# Patient Record
Sex: FEMALE | Race: WHITE | NOT HISPANIC OR LATINO | Employment: FULL TIME | URBAN - METROPOLITAN AREA
[De-identification: names, ages, dates, MRNs, and addresses within clinical notes are randomized per-mention and may not be internally consistent; named-entity substitution may affect disease eponyms.]

---

## 2017-02-08 ENCOUNTER — OFFICE VISIT (OUTPATIENT)
Dept: FAMILY MEDICINE | Facility: CLINIC | Age: 47
End: 2017-02-08
Payer: COMMERCIAL

## 2017-02-08 VITALS
DIASTOLIC BLOOD PRESSURE: 72 MMHG | BODY MASS INDEX: 29.43 KG/M2 | TEMPERATURE: 97.9 F | OXYGEN SATURATION: 99 % | SYSTOLIC BLOOD PRESSURE: 142 MMHG | HEIGHT: 64 IN | HEART RATE: 78 BPM | WEIGHT: 172.4 LBS

## 2017-02-08 DIAGNOSIS — R09.82 POST-NASAL DRIP: ICD-10-CM

## 2017-02-08 DIAGNOSIS — J02.9 PHARYNGITIS, UNSPECIFIED ETIOLOGY: Primary | ICD-10-CM

## 2017-02-08 DIAGNOSIS — E78.5 HYPERLIPIDEMIA LDL GOAL <130: ICD-10-CM

## 2017-02-08 LAB
DEPRECATED S PYO AG THROAT QL EIA: NORMAL
MICRO REPORT STATUS: NORMAL
SPECIMEN SOURCE: NORMAL

## 2017-02-08 PROCEDURE — 87880 STREP A ASSAY W/OPTIC: CPT | Performed by: NURSE PRACTITIONER

## 2017-02-08 PROCEDURE — 87081 CULTURE SCREEN ONLY: CPT | Performed by: NURSE PRACTITIONER

## 2017-02-08 PROCEDURE — 99213 OFFICE O/P EST LOW 20 MIN: CPT | Performed by: NURSE PRACTITIONER

## 2017-02-08 ASSESSMENT — ENCOUNTER SYMPTOMS
SINUS PRESSURE: 0
LIGHT-HEADEDNESS: 0
CHILLS: 0
FATIGUE: 0
NAUSEA: 0
DIAPHORESIS: 0
COUGH: 0
FEVER: 0
DIZZINESS: 0
EYE DISCHARGE: 0
EYE ITCHING: 0
SORE THROAT: 1
SHORTNESS OF BREATH: 0
DIARRHEA: 0
CONSTIPATION: 0
RHINORRHEA: 0
HEADACHES: 0
WHEEZING: 0

## 2017-02-08 NOTE — PATIENT INSTRUCTIONS
-Start OTC Claritin, Zyrtec, or Allegra-may use store brand as well.     Notify if no improvement over the next 2 weeks

## 2017-02-08 NOTE — Clinical Note
[facility]  [address] [address]  Phone: [phone]    February 10, 2017    Aixa Coker  [facility] [facility] [address]              Dear Ms. Coker,      The results of your 24 hour throat culture were negative. Please contact your clinic if you have any questions or concerns.              Sincerely,      [facility]

## 2017-02-08 NOTE — MR AVS SNAPSHOT
After Visit Summary   2/8/2017    Aixa Coker    MRN: [id number]           Patient Information     Date Of Birth          1970        Visit Information        Provider Department      2/8/2017 10:40 AM Zuly Colon APRN [facility]        Today's Diagnoses     Pharyngitis, unspecified etiology    -  1     CARDIOVASCULAR SCREENING; LDL GOAL LESS THAN 160         Hyperlipidemia LDL goal <130           Care Instructions       -Start OTC Claritin, Zyrtec, or Allegra-may use store brand as well.     Notify if no improvement over the next 2 weeks                    Follow-ups after your visit        Future tests that were ordered for you today     Open Future Orders        Priority Expected Expires Ordered    Lipid panel reflex to direct LDL Routine  2/8/2018 2/8/2017            Who to contact     Normal or non-critical lab and imaging results will be communicated to you by [facility], letter or phone within 4 business days after the clinic has received the results. If you do not hear from us within 7 days, please contact the clinic through [facility] or phone. If you have a critical or abnormal lab result, we will notify you by phone as soon as possible.  Submit refill requests through [employer] or call your pharmacy and they will forward the refill request to us. Please allow 3 business days for your refill to be completed.          If you need to speak with a  for additional information , please call: [phone]           Additional Information About Your Visit        [facility] Information     [employer] gives you secure access to your electronic health record. If you see a primary care provider, you can also send messages to your care team and make appointments. If you have questions, please call your primary care clinic.  If you do not have a primary care provider, please call [phone] and they will assist you.        Care EveryWhere ID     This is  "your Care EveryWhere ID. This could be used by other organizations to access your [place] medical records  [id number]        Your Vitals Were     Pulse Temperature Height BMI (Body Mass Index) Pulse Oximetry       78 97.9  F (36.6  C) (Tympanic) 5' 3.75\" (1.619 m) 29.83 kg/m2 99%        Blood Pressure from Last 3 Encounters:   02/08/17 142/72   09/21/16 148/83   08/09/16 150/93    Weight from Last 3 Encounters:   02/08/17 172 lb 6.4 oz (78.2 kg)   10/16/14 164 lb (74.39 kg)   08/27/14 164 lb (74.39 kg)              We Performed the Following     Beta strep group A culture     Rapid strep screen        Primary Care Provider Office Phone # Fax #    Jaye Atkins DO Javier [phone]       [address]   [place] [address]        Thank you!     Thank you for choosing [facility]  for your care. Our goal is always to provide you with excellent care. Hearing back from our patients is one way we can continue to improve our services. Please take a few minutes to complete the written survey that you may receive in the mail after your visit with us. Thank you!             Your Updated Medication List - Protect others around you: Learn how to safely use, store and throw away your medicines at www.disposemymeds.org.      Notice  As of 2/8/2017 11:02 AM    You have not been prescribed any medications.      "

## 2017-02-08 NOTE — NURSING NOTE
"Chief Complaint   Patient presents with     Pharyngitis       Initial /72 mmHg  Pulse 78  Temp(Src) 97.9  F (36.6  C) (Tympanic)  Ht 5' 3.75\" (1.619 m)  Wt 172 lb 6.4 oz (78.2 kg)  BMI 29.83 kg/m2  SpO2 99% Estimated body mass index is 29.83 kg/(m^2) as calculated from the following:    Height as of this encounter: 5' 3.75\" (1.619 m).    Weight as of this encounter: 172 lb 6.4 oz (78.2 kg).  BP completed using cuff size: jesus alberto Vu CMA      "

## 2017-02-08 NOTE — PROGRESS NOTES
SUBJECTIVE:                                                    Aixa Coker is a 46 year old female who presents to clinic today for the following health issues:      Has had sore throat off and on for the last month. Did have some swelling behind right ear and now that is better. Does feel like has some drainage. No cough. Advil occasional if is having some discomfort at night. Having pressure to left ear. Is not around pets. No allergies that is aware of    ENT Symptoms             Symptoms: cc Present Absent Comment   Fever/Chills   x    Fatigue   x    Muscle Aches   x    Eye Irritation   x    Sneezing  x  mild   Nasal [place]/Drg  x     Sinus Pressure/Pain   x    Loss of smell   x    Dental pain   x    Sore Throat x x  Hurts to swallow   Swollen Glands  x     Ear Pain/Fullness  x  Left ear pressure, pain near right ear one week ago   Cough   x    Wheeze   x    Chest Pain   x    Shortness of breath   x    Rash   x    Other   x      Symptom duration:  6-8 weeks   Symptom severity:  severe   Treatments tried:  warm washcloth by ear, Advil   Contacts:  work         Problem list and histories reviewed & adjusted, as indicated.  Additional history: as documented    No current outpatient prescriptions on file.     No Known Allergies    ROS:  Review of Systems   Constitutional: Negative for fever, chills, diaphoresis and fatigue.   HENT: Positive for congestion, ear pain (bilat, left is worse than right, but right was worse last week. ), sneezing and sore throat (swollen glands). Negative for ear discharge, hearing loss, rhinorrhea and sinus pressure.    Eyes: Negative for discharge and itching.   Respiratory: Negative for cough, shortness of breath and wheezing.    Gastrointestinal: Negative for nausea, diarrhea and constipation.   Skin: Negative for rash.   Neurological: Negative for dizziness, light-headedness and headaches.         OBJECTIVE:                                                    /72 mmHg   "Pulse 78  Temp(Src) 97.9  F (36.6  C) (Tympanic)  Ht 5' 3.75\" (1.619 m)  Wt 172 lb 6.4 oz (78.2 kg)  BMI 29.83 kg/m2  SpO2 99%  Body mass index is 29.83 kg/(m^2).  Physical Exam   Constitutional: She appears well-developed.   HENT:   Right Ear: Tympanic membrane and external ear normal.   Left Ear: Tympanic membrane and external ear normal.   Nose: No mucosal edema or rhinorrhea.   Mouth/Throat:       Cardiovascular: Normal rate, regular rhythm and normal heart sounds.    Pulmonary/Chest: Effort normal and breath sounds normal.   Abdominal: Soft. Bowel sounds are normal.   Neurological: She is alert.   Skin: Skin is warm and dry.   Psychiatric: She has a normal mood and affect.         Diagnostic Test Results:  Strep screen - Negative     ASSESSMENT/PLAN:                                                    1. Pharyngitis, unspecified etiology  - Rapid strep screen  - Beta strep group A culture    2. Hyperlipidemia LDL goal <130  Return for fasting labs   - Lipid panel reflex to direct LDL; Future    3. Post-nasal drip  Treatment plan and medications reviewed and understood by patient.   Instructed to call or return for:   - trouble breathing   - sensation of throat tightness   - symptoms worsen or fail to resolve    -Start OTC Claritin, Zyrtec, or Allegra   Notify if no improvement in drainage and may need refer to allergy for further eval.           GUDELIA Monroy [facility]    "

## 2017-02-10 LAB
BACTERIA SPEC CULT: NORMAL
MICRO REPORT STATUS: NORMAL
SPECIMEN SOURCE: NORMAL

## 2017-08-17 ENCOUNTER — OFFICE VISIT (OUTPATIENT)
Dept: FAMILY MEDICINE | Facility: CLINIC | Age: 47
End: 2017-08-17
Payer: COMMERCIAL

## 2017-08-17 VITALS
TEMPERATURE: 98.6 F | HEART RATE: 72 BPM | BODY MASS INDEX: 26.8 KG/M2 | HEIGHT: 64 IN | WEIGHT: 157 LBS | DIASTOLIC BLOOD PRESSURE: 74 MMHG | SYSTOLIC BLOOD PRESSURE: 128 MMHG

## 2017-08-17 DIAGNOSIS — N94.10 DYSPAREUNIA IN FEMALE: ICD-10-CM

## 2017-08-17 DIAGNOSIS — Z13.6 CARDIOVASCULAR SCREENING; LDL GOAL LESS THAN 160: ICD-10-CM

## 2017-08-17 DIAGNOSIS — Z00.00 ENCOUNTER FOR ROUTINE ADULT HEALTH EXAMINATION WITHOUT ABNORMAL FINDINGS: Primary | ICD-10-CM

## 2017-08-17 DIAGNOSIS — R07.0 THROAT PAIN: ICD-10-CM

## 2017-08-17 DIAGNOSIS — N91.2 ABSENCE OF MENSTRUATION: ICD-10-CM

## 2017-08-17 DIAGNOSIS — N95.1 VAGINAL DRYNESS, MENOPAUSAL: ICD-10-CM

## 2017-08-17 LAB
CHOLEST SERPL-MCNC: 190 MG/DL
FSH SERPL-ACNC: 55.9 IU/L
GLUCOSE SERPL-MCNC: 97 MG/DL (ref 70–99)
HDLC SERPL-MCNC: 49 MG/DL
LDLC SERPL CALC-MCNC: 120 MG/DL
LH SERPL-ACNC: 29.8 IU/L
NONHDLC SERPL-MCNC: 141 MG/DL
TRIGL SERPL-MCNC: 104 MG/DL

## 2017-08-17 PROCEDURE — 82947 ASSAY GLUCOSE BLOOD QUANT: CPT | Performed by: PHYSICIAN ASSISTANT

## 2017-08-17 PROCEDURE — 99396 PREV VISIT EST AGE 40-64: CPT | Performed by: PHYSICIAN ASSISTANT

## 2017-08-17 PROCEDURE — 83002 ASSAY OF GONADOTROPIN (LH): CPT | Performed by: PHYSICIAN ASSISTANT

## 2017-08-17 PROCEDURE — 80061 LIPID PANEL: CPT | Performed by: PHYSICIAN ASSISTANT

## 2017-08-17 PROCEDURE — 83001 ASSAY OF GONADOTROPIN (FSH): CPT | Performed by: PHYSICIAN ASSISTANT

## 2017-08-17 PROCEDURE — 36415 COLL VENOUS BLD VENIPUNCTURE: CPT | Performed by: PHYSICIAN ASSISTANT

## 2017-08-17 NOTE — PROGRESS NOTES
"   SUBJECTIVE:   CC: Aixa Coker is an 47 year old woman who presents for preventive health visit.     Healthy Habits:    Do you get at least three servings of calcium containing foods daily (dairy, green leafy vegetables, etc.)? no, taking calcium and/or vitamin D supplement: no    Amount of exercise or daily activities, outside of work: 3-4 day(s) per week    Problems taking medications regularly No    Medication side effects: No    Have you had an eye exam in the past two years? no    Do you see a dentist twice per year? yes    Do you have sleep apnea, excessive snoring or daytime drowsiness?yes, daytime drowsiness         Wanting to talk about menopause. Having some symptoms.  Not sure if she's in menopause or not  Had an ablation about 7-8 years ago because she was having heavy, continuous bleeding  Has not had a period since  Denies hot flashes  + not sleeping well at night -  More restless than normal  + painful intercourse which is frustrating for her and her   Has h/o frequent vaginal and yeast infections so for awhile that was affecting her desire to have sex. Now it is uncomfortable   Has not tried over the counter lubricants as she is worried about reacting as she is very \"sensitive\"     Also has an intermittent sore throat - not bothersome today  Would like to see ENT  Was told at one point she might have some PND   Has not tried any nasal sprays  No h/o GERD or heartburn      Today's PHQ-2 Score:   PHQ-2 ( 1999 Pf[employer]) 8/17/2017 8/27/2014   Q1: Little interest or pleasure in doing things 0 0   Q2: Feeling down, depressed or hopeless 0 0   PHQ-2 Score 0 0       Abuse: Current or Past(Physical, Sexual or Emotional)- No  Do you feel safe in your environment - Yes    Social History   Substance Use Topics     Smoking status: Never Smoker     Smokeless tobacco: Never Used     Alcohol use Yes      Comment: Avg. 1-2 drinks per week     The patient does not drink >3 drinks per day nor >7 drinks " "per week.    Reviewed orders with patient.  Reviewed health maintenance and updated orders accordingly - Yes  BP Readings from Last 3 Encounters:   08/17/17 128/74   02/08/17 142/72   09/21/16 148/83    Wt Readings from Last 3 Encounters:   08/17/17 157 lb (71.2 kg)   02/08/17 172 lb 6.4 oz (78.2 kg)   10/16/14 164 lb (74.4 kg)                      Patient under age 50, mutual decision reflected in health maintenance.        Pertinent mammograms are reviewed under the imaging tab.  History of abnormal Pap smear: NO - age 30-65 PAP every 5 years with negative HPV co-testing recommended    Reviewed and updated as needed this visit by clinical staffTobacco  Allergies  Meds  Med Hx  Surg Hx  Fam Hx  Soc Hx        Reviewed and updated as needed this visit by Provider          ROS:  C: NEGATIVE for fever, chills, change in weight  I: NEGATIVE for worrisome rashes, moles or lesions  E: NEGATIVE for vision changes or irritation  ENT: NEGATIVE for ear, mouth and throat problems  R: NEGATIVE for significant cough or SOB  B: NEGATIVE for masses, tenderness or discharge  CV: NEGATIVE for chest pain, palpitations or peripheral edema  GI: NEGATIVE for nausea, abdominal pain, heartburn, or change in bowel habits  : NEGATIVE for unusual urinary or vaginal symptoms. Periods are regular.  M: NEGATIVE for significant arthralgias or myalgia  N: NEGATIVE for weakness, dizziness or paresthesias  P: NEGATIVE for changes in mood or affect    OBJECTIVE:   /74 (BP Location: Right arm, Patient Position: Chair, Cuff Size: Adult Regular)  Pulse 72  Temp 98.6  F (37  C) (Tympanic)  Ht 5' 3.75\" (1.619 m)  Wt 157 lb (71.2 kg)  BMI 27.16 kg/m2  EXAM:  GENERAL: healthy, alert and no distress  EYES: Eyes grossly normal to inspection, PERRL and conjunctivae and sclerae normal  HENT: ear canals and TM's normal, nose and mouth without ulcers or lesions  NECK: no adenopathy, no asymmetry, masses, or scars and thyroid normal to " "palpation  RESP: lungs clear to auscultation - no rales, rhonchi or wheezes  BREAST: normal without masses, tenderness or nipple discharge and no palpable axillary masses or adenopathy  CV: regular rate and rhythm, normal S1 S2, no S3 or S4, no murmur, click or rub, no peripheral edema and peripheral pulses strong  ABDOMEN: soft, nontender, no hepatosplenomegaly, no masses and bowel sounds normal  MS: no gross musculoskeletal defects noted, no edema  SKIN: no suspicious lesions or rashes  NEURO: Normal strength and tone, mentation intact and speech normal  PSYCH: mentation appears normal, affect normal/bright    ASSESSMENT/PLAN:       ICD-10-CM    1. Encounter for routine adult health examination without abnormal findings Z00.00    2. Vaginal dryness, menopausal N95.1 conjugated estrogens (PREMARIN) cream   3. Dyspareunia in female N94.10 conjugated estrogens (PREMARIN) cream   4. Throat pain R07.0 OTOLARYNGOLOGY REFERRAL   5. CARDIOVASCULAR SCREENING; LDL GOAL LESS THAN 160 Z13.6 GLUCOSE     Lipid panel reflex to direct LDL   6. Absence of menstruation N91.2 Follicle stimulating hormone     Lutropin           COUNSELING:   Reviewed preventive health counseling, as reflected in patient instructions       Regular exercise       Healthy diet/nutrition         reports that she has never smoked. She has never used smokeless tobacco.    Estimated body mass index is 27.16 kg/(m^2) as calculated from the following:    Height as of this encounter: 5' 3.75\" (1.619 m).    Weight as of this encounter: 157 lb (71.2 kg).         Counseling Resources:  ATP IV Guidelines  Pooled Cohorts Equation Calculator  Breast Cancer Risk Calculator  FRAX Risk Assessment  ICSI Preventive Guidelines  Dietary Guidelines for Americans, 2010  USDA's MyPlate  ASA Prophylaxis  Lung CA Screening    Gracia Luu PA-C  [facility]  "

## 2017-08-17 NOTE — PATIENT INSTRUCTIONS
Topical estrogen - for vaginal dryness    Start by using two times weekly  Let me know in a few weeks how things are going     I will contact you within a few days about the lab results        Preventive Health Recommendations  Female Ages 40 to 49    Yearly exam:     See your health care provider every year in order to  1. Review health changes.   2. Discuss preventive care.    3. Review your medicines if your doctor prescribed any.      Get a Pap test every three years (unless you have an abnormal result and your provider advises testing more often).      If you get Pap tests with HPV test, you only need to test every 5 years, unless you have an abnormal result. You do not need a Pap test if your uterus was removed (hysterectomy) and you have not had cancer.      You should be tested each year for STDs (sexually transmitted diseases), if you're at risk.       Ask your doctor if you should have a mammogram.      Have a colonoscopy (test for colon cancer) if someone in your family has had colon cancer or polyps before age 50.       Have a cholesterol test every 5 years.       Have a diabetes test (fasting glucose) after age 45. If you are at risk for diabetes, you should have this test every 3 years.    Shots: Get a flu shot each year. Get a tetanus shot every 10 years.     Nutrition:     Eat at least 5 servings of fruits and vegetables each day.    Eat whole-grain bread, whole-wheat pasta and brown rice instead of white grains and rice.    Talk to your provider about Calcium and Vitamin D.     Lifestyle    Exercise at least 150 minutes a week (an average of 30 minutes a day, 5 days a week). This will help you control your weight and prevent disease.    Limit alcohol to one drink per day.    No smoking.     Wear sunscreen to prevent skin cancer.    See your dentist every six months for an exam and cleaning.

## 2017-08-17 NOTE — MR AVS SNAPSHOT
After Visit Summary   8/17/2017    Aixa Coker    MRN: [id number]           Patient Information     Date Of Birth          1970        Visit Information        Provider Department      8/17/2017 8:40 AM Gracia Luu PA-C [facility]        Today's Diagnoses     CARDIOVASCULAR SCREENING; LDL GOAL LESS THAN 160    -  1    Absence of menstruation        Vaginal dryness, menopausal        Dyspareunia in female        Throat pain          Care Instructions    Topical estrogen - for vaginal dryness    Start by using two times weekly  Let me know in a few weeks how things are going     I will contact you within a few days about the lab results        Preventive Health Recommendations  Female Ages 40 to 49    Yearly exam:     See your health care provider every year in order to  1. Review health changes.   2. Discuss preventive care.    3. Review your medicines if your doctor prescribed any.      Get a Pap test every three years (unless you have an abnormal result and your provider advises testing more often).      If you get Pap tests with HPV test, you only need to test every 5 years, unless you have an abnormal result. You do not need a Pap test if your uterus was removed (hysterectomy) and you have not had cancer.      You should be tested each year for STDs (sexually transmitted diseases), if you're at risk.       Ask your doctor if you should have a mammogram.      Have a colonoscopy (test for colon cancer) if someone in your family has had colon cancer or polyps before age 50.       Have a cholesterol test every 5 years.       Have a diabetes test (fasting glucose) after age 45. If you are at risk for diabetes, you should have this test every 3 years.    Shots: Get a flu shot each year. Get a tetanus shot every 10 years.     Nutrition:     Eat at least 5 servings of fruits and vegetables each day.    Eat whole-grain bread, whole-wheat pasta and brown rice instead of  white grains and rice.    Talk to your provider about Calcium and Vitamin D.     Lifestyle    Exercise at least 150 minutes a week (an average of 30 minutes a day, 5 days a week). This will help you control your weight and prevent disease.    Limit alcohol to one drink per day.    No smoking.     Wear sunscreen to prevent skin cancer.    See your dentist every six months for an exam and cleaning.          Follow-ups after your visit        Additional Services     OTOLARYNGOLOGY REFERRAL       Your provider has referred you to: [facility] [phone]   [email or web address]    Please be aware that coverage of these services is subject to the terms and limitations of your health insurance plan.  Call member services at your health plan with any benefit or coverage questions.      Please bring the following with you to your appointment:    (1) Any X-Rays, CTs or MRIs which have been performed.  Contact the facility where they were done to arrange for  prior to your scheduled appointment.   (2) List of current medications  (3) This referral request   (4) Any documents/labs given to you for this referral                  Your next 10 appointments already scheduled     Aug 21, 2017  4:45 PM CDT   [facility] Kaye Richardson PA-C   [facility]    [address] [address]   [phone]            Aug 25, 2017  2:30 PM CDT   MA SCREENING DIGITAL BILATERAL with [address] [facility] ([facility])    [address] [address]   [phone]           Do not use any powder, lotion or deodorant under your arms or on your breast. If you do, we will ask you to remove it before your exam.  Wear comfortable, two-piece clothing.  If you have any allergies, tell your care team.  Bring any previous mammograms from other facilities or have them mailed to the breast  "[place]. Three-dimensional (3D) mammograms are available at [facility] locations in [facility], [place], [place], [facility], and [place]. [facility] locations include [place] and [facility] in [place]. Benefits of 3D mammograms include: - Improved rate of cancer detection - Decreases your chance of having to go back for more tests, which means fewer: - \"False-positive\" results (This means that there is an abnormal area but it isn't cancer.) - Invasive testing procedures, such as a biopsy or surgery - Can provide clearer images of the breast if you have dense breast tissue. 3D mammography is an optional exam that anyone can have with a 2D mammogram. It doesn't replace or take the place of a 2D mammogram. 2D mammograms remain an effective screening test for all women.  Not all insurance companies cover the cost of a 3D mammogram. Check with your insurance.              Who to contact     Normal or non-critical lab and imaging results will be communicated to you by [employer], letter or phone within 4 business days after the clinic has received the results. If you do not hear from us within 7 days, please contact the clinic through [employer] or phone. If you have a critical or abnormal lab result, we will notify you by phone as soon as possible.  Submit refill requests through [employer] or call your pharmacy and they will forward the refill request to us. Please allow 3 business days for your refill to be completed.          If you need to speak with a  for additional information , please call: [phone]             Additional Information About Your Visit        [employer] Information     [employer] gives you secure access to your electronic health record. If you see a primary care provider, you can also send messages to your care team and make appointments. If you have questions, please call your primary care clinic.  If you do not have a primary care provider, please call " "[phone] and they will assist you.        Care EveryWhere ID     This is your Care EveryWhere ID. This could be used by other organizations to access your [place] medical records  [id number]        Your Vitals Were     Pulse Temperature Height BMI (Body Mass Index)          72 98.6  F (37  C) (Tympanic) 5' 3.75\" (1.619 m) 27.16 kg/m2         Blood Pressure from Last 3 Encounters:   08/17/17 128/74   02/08/17 142/72   09/21/16 148/83    Weight from Last 3 Encounters:   08/17/17 157 lb (71.2 kg)   02/08/17 172 lb 6.4 oz (78.2 kg)   10/16/14 164 lb (74.4 kg)              We Performed the Following     Follicle stimulating hormone     GLUCOSE     Lipid panel reflex to direct LDL     Lutropin     OTOLARYNGOLOGY REFERRAL          Today's Medication Changes          These changes are accurate as of: 8/17/17  9:44 AM.  If you have any questions, ask your nurse or doctor.               Start taking these medicines.        Dose/Directions    conjugated estrogens cream   Commonly known as:  PREMARIN   Used for:  Vaginal dryness, menopausal, Dyspareunia in female   Started by:  Gracia Luu PA-C        Dose:  0.5 g   Place 0.5 g vaginally twice a week   Quantity:  30 g   Refills:  12            Where to get your medicines      These medications were sent [place] [facility] [place] - [address]  [address] [place] [facility] [address]     Phone:  [phone]     conjugated estrogens cream                Primary Care Provider Office Phone # Fax #    Jaye Milly Herrera [phone] [address] [place] [address]        Equal Access to Services     MATTHIAS METCALF AH: Hadii markel Soriano, wamaria gda luqadaha, qaybta kaalmada adeegyada, gasper ramos. So [facility] [phone].    ATENCIÓN: Si habla español, tiene a bess disposición servicios gratuitos de asistencia lingüística. Llame al [phone].    We comply with applicable federal " civil rights laws and [place] laws. We do not discriminate on the basis of race, color, national origin, age, disability sex, sexual orientation or gender identity.            Thank you!     Thank you for choosing [facility]  for your care. Our goal is always to provide you with excellent care. Hearing back from our patients is one way we can continue to improve our services. Please take a few minutes to complete the written survey that you may receive in the mail after your visit with us. Thank you!             Your Updated Medication List - Protect others around you: Learn how to safely use, store and throw away your medicines at www.disposemymeds.org.          This list is accurate as of: 8/17/17  9:44 AM.  Always use your most recent med list.                   Brand Name Dispense Instructions for use Diagnosis    conjugated estrogens cream    PREMARIN    30 g    Place 0.5 g vaginally twice a week    Vaginal dryness, menopausal, Dyspareunia in female

## 2017-08-17 NOTE — NURSING NOTE
"Chief Complaint   Patient presents with     Physical       Initial /74 (BP Location: Right arm, Patient Position: Chair, Cuff Size: Adult Regular)  Pulse 72  Temp 98.6  F (37  C) (Tympanic)  Ht 5' 3.75\" (1.619 m)  Wt 157 lb (71.2 kg)  BMI 27.16 kg/m2 Estimated body mass index is 27.16 kg/(m^2) as calculated from the following:    Height as of this encounter: 5' 3.75\" (1.619 m).    Weight as of this encounter: 157 lb (71.2 kg).  Medication Reconciliation: complete     Elan Watkins CMA    "

## 2017-08-21 ENCOUNTER — OFFICE VISIT (OUTPATIENT)
Dept: DERMATOLOGY | Facility: CLINIC | Age: 47
End: 2017-08-21
Payer: COMMERCIAL

## 2017-08-21 VITALS — SYSTOLIC BLOOD PRESSURE: 130 MMHG | HEART RATE: 64 BPM | DIASTOLIC BLOOD PRESSURE: 76 MMHG | OXYGEN SATURATION: 100 %

## 2017-08-21 DIAGNOSIS — D48.5 NEOPLASM OF UNCERTAIN BEHAVIOR OF SKIN: Primary | ICD-10-CM

## 2017-08-21 DIAGNOSIS — D18.00 ANGIOMA: ICD-10-CM

## 2017-08-21 DIAGNOSIS — D22.9 NEVUS: ICD-10-CM

## 2017-08-21 DIAGNOSIS — L82.1 SEBORRHEIC KERATOSIS: ICD-10-CM

## 2017-08-21 DIAGNOSIS — L81.4 LENTIGO: ICD-10-CM

## 2017-08-21 DIAGNOSIS — L82.0 INFLAMED SEBORRHEIC KERATOSIS: ICD-10-CM

## 2017-08-21 PROCEDURE — 11100 HC BIOPSY SKIN/SUBQ/MUC MEM, EACH ADDTL LESION: CPT | Mod: 59 | Performed by: PHYSICIAN ASSISTANT

## 2017-08-21 PROCEDURE — 99214 OFFICE O/P EST MOD 30 MIN: CPT | Mod: 25 | Performed by: PHYSICIAN ASSISTANT

## 2017-08-21 PROCEDURE — 17110 DESTRUCTION B9 LES UP TO 14: CPT | Performed by: PHYSICIAN ASSISTANT

## 2017-08-21 PROCEDURE — 11101 HC DESTRUCT BENIGN LESION, UP TO 14: CPT | Performed by: PHYSICIAN ASSISTANT

## 2017-08-21 PROCEDURE — 88305 TISSUE EXAM BY PATHOLOGIST: CPT | Performed by: PHYSICIAN ASSISTANT

## 2017-08-21 NOTE — PATIENT INSTRUCTIONS
Wound Care Instructions     FOR SUPERFICIAL WOUNDS     [facility] [phone]    [facility] [phone]                       AFTER 24 HOURS YOU SHOULD REMOVE THE BANDAGE AND BEGIN DAILY DRESSING CHANGES AS FOLLOWS:     1) Remove Dressing.     2) Clean and dry the area with tap water using a Q-tip or sterile gauze pad.     3) Apply Vaseline, Aquaphor, Polysporin ointment or Bacitracin ointment over entire wound.  Do NOT use Neosporin ointment.     4) Cover the wound with a band-aid, or a sterile non-stick gauze pad and micropore paper tape      REPEAT THESE INSTRUCTIONS AT LEAST ONCE A DAY UNTIL THE WOUND HAS COMPLETELY HEALED.    It is an old wives tale that a wound heals better when it is exposed to air and allowed to dry out. The wound will heal faster with a better cosmetic result if it is kept moist with ointment and covered with a bandage.    **Do not let the wound dry out.**      Supplies Needed:      *Cotton tipped applicators (Q-tips)    *Polysporin Ointment or Bacitracin Ointment (NOT NEOSPORIN)    *Band-aids or non-stick gauze pads and micropore paper tape.      PATIENT INFORMATION:    During the healing process you will notice a number of changes. All wounds develop a small halo of redness surrounding the wound.  This means healing is occurring. Severe itching with extensive redness usually indicates sensitivity to the ointment or bandage tape used to dress the wound.  You should call our office if this develops.      Swelling  and/or discoloration around your surgical site is common, particularly when performed around the eye.    All wounds normally drain.  The larger the wound the more drainage there will be.  After 7-10 days, you will notice the wound beginning to shrink and new skin will begin to grow.  The wound is healed when you can see skin has formed over the entire area.  A healed wound has a healthy, shiny look to the surface and is red to dark pink in color  to normalize.  Wounds may take approximately 4-6 weeks to heal.  Larger wounds may take 6-8 weeks.  After the wound is healed you may discontinue dressing changes.    You may experience a sensation of tightness as your wound heals. This is normal and will gradually subside.    Your healed wound may be sensitive to temperature changes. This sensitivity improves with time, but if you re having a lot of discomfort, try to avoid temperature extremes.    Patients frequently experience itching after their wound appears to have healed because of the continue healing under the skin.  Plain Vaseline will help relieve the itching.        POSSIBLE COMPLICATIONS    BLEEDIN. Leave the bandage in place.  2. Use tightly rolled up gauze or a cloth to apply direct pressure over the bandage for 30  minutes.  3. Reapply pressure for an additional 30 minutes if necessary  4. Use additional gauze and tape to maintain pressure once the bleeding has stopped.    WOUND CARE INSTRUCTIONS   FOR CRYOSURGERY   This area treated with liquid nitrogen will form a blister. You do not need to bandage the area until after the blister forms and breaks (which may be a few days). When the blister breaks, begin daily dressing changes as follows:   1) Clean and dry the area with tap water using clean Q-tip or sterile gauze pad.   2) Apply Polysporin ointment or Bacitracin ointment over entire wound. Do NOT use Neosporin ointment.   3) Cover the wound with a band-aid or sterile non-stick gauze pad and micropore paper tape.   REPEAT THESE INSTRUCTIONS AT LEAST ONCE A DAY UNTIL THE WOUND HAS COMPLETELY HEALED.   It is an old wives tale that a wound heals better when it is exposed to air and allowed to dry out. The wound will heal faster with a better cosmetic result if it is kept moist with ointment and covered with a bandage.   Do not let the wound dry out.   IMPORTANT INFORMATION ON REVERSE SIDE   Supplies Needed:   *Cotton tipped applicators  (Q-tips)   *Polysporin ointment or Bacitracin ointment (NOT NEOSPORIN)   *Band-aids, or non stick gauze pads and micropore paper tape   PATIENT INFORMATION   During the healing process you will notice a number of changes. All wounds develop a small halo of redness surrounding the wound. This means healing is occurring. Severe itching with extensive redness usually indicates sensitivity to the ointment or bandage tape used to dress the wound. You should call our office if this develops.   Swelling and/or discoloration around your surgical site is common, particularly when performed around the eye.   All wounds normally drain. The larger the wound the more drainage there will be. After 7-10 days, you will notice the wound beginning to shrink and new skin will begin to grow. The wound is healed when you can see skin has formed over the entire area. A healed wound has a healthy, shiny look to the surface and is red to dark pink in color to normalize. Wounds may take approximately 4-6 weeks to heal. Larger wounds may take 6-8 weeks. After the wound is healed you may discontinue dressing changes.   You may experience a sensation of tightness as your wound heals. This is normal and will gradually subside.   Your healed wound may be sensitive to temperature changes. This sensitivity improves with time, but if you re having a lot of discomfort, try to avoid temperature extremes.   Patients frequently experience itching after their wound appears to have healed because of the continue healing under the skin. Plain Vaseline will help relieve the itching.

## 2017-08-21 NOTE — PROGRESS NOTES
HPI:   Aixa Coker is a 47 year old female who presents for Full skin cancer screening.  chief complaint  Last Skin Exam: 1 year ago      1st Baseline: no  Personal HX of Skin Cancer: Yes BCC on mid upper lip   Personal HX of Malignant Melanoma: no   Family HX of Skin Cancer / Malignant Melanoma: no  Personal HX of Atypical Moles:   no  Risk factors: sun exposure; frequent burns in youth  New / Changing lesions:yes few areas  Social History:   On review of systems, there are no further skin complaints, patient is feeling otherwise well.  See patient intake sheet.  ROS of the following were done and are negative: Constitutional, Eyes, Ears, Nose,   Mouth, Throat, Cardiovascular, Respiratory, GI, Genitourinary, Musculoskeletal,   Psychiatric, Endocrine, Allergic/Immunologic.    PHYSICAL EXAM:   Weight:  BP:   Skin exam performed as follows: Type 2 skin. Mood appropriate  Alert and Oriented X 3. Well developed, well nourished in no distress.  General appearance: Normal  Head including face: Normal  Eyes: conjunctiva and lids: Normal  Mouth: Lips, teeth, gums: Normal  Neck: Normal  Chest-breast/axillae: Normal  Back: Normal  Spleen and liver: Normal  Cardiovascular: Exam of peripheral vascular system by observation for swelling, varicosities, edema: Normal  Genitalia: groin, buttocks: Normal  Extremities: digits/nails (clubbing): Normal  Eccrine and Apocrine glands: Normal  Right upper extremity: Normal  Left upper extremity: Normal  Right lower extremity: Normal  Left lower extremity: Normal  Skin: Scalp and body hair: See below    Pt deferred exam of breasts, groin, buttocks: No    Other physical findings:  1. Multiple pigmented macules on extremities and trunk  2. Multiple pigmented macules on face, trunk and extremities  3. Multiple vascular papules on trunk, arms and legs  4. Multiple scattered keratotic plaques  5. 3 mm pink papule on left mid cheek  6. 5 mm pink papule on right wrist  7. Inflamed  keratotic papule on left breast x 1, right temple x 1       Except as noted above, no other signs of skin cancer or melanoma.     ASSESSMENT/PLAN:   Benign Full skin cancer screening today. . Patient with history of BCC on cupid's bow  Advised on monthly self exams and 1 year  Patient Education: Appropriate brochures given.    Multiple benign appearing nevi on arms, legs and trunk. Discussed ABCDEs of melanoma and sunscreen.   Multiple lentigos on arms, legs and trunk. Advised benign, no treatment needed.  Multiple scattered angiomas. Advised benign, no treatment needed.   Seborrheic keratosis on arms, legs and trunk. Advised benign, no treatment needed.  R/o BCC on left mid cheek, right wrist. Shave bx in typical fashion .  Area cleaned with betadyne and anesthetized with 1% lidocaine with epi .  Dermablade used to remove the lesion and sent to pathology. Bleeding was cauterized. Pt tolerated procedure well.  Inflamed seborrheic keratosis on left breast x 1, right temple x 1. As physically tender cryosurgery performed. Advised on post op care.   History of BCC on upper lip - free of any signs of recurrence today. Cicatrix somewhat hypertrophic still; offered ILK; she would prefer to do no thing at this point.       Follow-up: pending path/yearly FSE/PRN sooner    1.) Patient was asked about new and changing moles. YES  2.) Patient received a complete physical skin examination: YES  3.) Patient was counseled to perform a monthly self skin examination: YES  Scribed By: Kaye Richardson, MS, PA-C

## 2017-08-21 NOTE — MR AVS SNAPSHOT
After Visit Summary   8/21/2017    Aixa Coker    MRN: [id number]           Patient Information     Date Of Birth          1970        Visit Information        Provider Department      8/21/2017 4:45 PM Kaye Richardson PA-C [facility]        Care Instructions          Wound Care Instructions     FOR SUPERFICIAL WOUNDS     [facility] [phone]    [facility] [phone]                       AFTER 24 HOURS YOU SHOULD REMOVE THE BANDAGE AND BEGIN DAILY DRESSING CHANGES AS FOLLOWS:     1) Remove Dressing.     2) Clean and dry the area with tap water using a Q-tip or sterile gauze pad.     3) Apply Vaseline, Aquaphor, Polysporin ointment or Bacitracin ointment over entire wound.  Do NOT use Neosporin ointment.     4) Cover the wound with a band-aid, or a sterile non-stick gauze pad and micropore paper tape      REPEAT THESE INSTRUCTIONS AT LEAST ONCE A DAY UNTIL THE WOUND HAS COMPLETELY HEALED.    It is an old wives tale that a wound heals better when it is exposed to air and allowed to dry out. The wound will heal faster with a better cosmetic result if it is kept moist with ointment and covered with a bandage.    **Do not let the wound dry out.**      Supplies Needed:      *Cotton tipped applicators (Q-tips)    *Polysporin Ointment or Bacitracin Ointment (NOT NEOSPORIN)    *Band-aids or non-stick gauze pads and micropore paper tape.      PATIENT INFORMATION:    During the healing process you will notice a number of changes. All wounds develop a small halo of redness surrounding the wound.  This means healing is occurring. Severe itching with extensive redness usually indicates sensitivity to the ointment or bandage tape used to dress the wound.  You should call our office if this develops.      Swelling  and/or discoloration around your surgical site is common, particularly when performed around the eye.    All wounds normally drain.  The larger  the wound the more drainage there will be.  After 7-10 days, you will notice the wound beginning to shrink and new skin will begin to grow.  The wound is healed when you can see skin has formed over the entire area.  A healed wound has a healthy, shiny look to the surface and is red to dark pink in color to normalize.  Wounds may take approximately 4-6 weeks to heal.  Larger wounds may take 6-8 weeks.  After the wound is healed you may discontinue dressing changes.    You may experience a sensation of tightness as your wound heals. This is normal and will gradually subside.    Your healed wound may be sensitive to temperature changes. This sensitivity improves with time, but if you re having a lot of discomfort, try to avoid temperature extremes.    Patients frequently experience itching after their wound appears to have healed because of the continue healing under the skin.  Plain Vaseline will help relieve the itching.        POSSIBLE COMPLICATIONS    BLEEDIN. Leave the bandage in place.  2. Use tightly rolled up gauze or a cloth to apply direct pressure over the bandage for 30  minutes.  3. Reapply pressure for an additional 30 minutes if necessary  4. Use additional gauze and tape to maintain pressure once the bleeding has stopped.    WOUND CARE INSTRUCTIONS   FOR CRYOSURGERY   This area treated with liquid nitrogen will form a blister. You do not need to bandage the area until after the blister forms and breaks (which may be a few days). When the blister breaks, begin daily dressing changes as follows:   1) Clean and dry the area with tap water using clean Q-tip or sterile gauze pad.   2) Apply Polysporin ointment or Bacitracin ointment over entire wound. Do NOT use Neosporin ointment.   3) Cover the wound with a band-aid or sterile non-stick gauze pad and micropore paper tape.   REPEAT THESE INSTRUCTIONS AT LEAST ONCE A DAY UNTIL THE WOUND HAS COMPLETELY HEALED.   It is an old wives tale that a wound  heals better when it is exposed to air and allowed to dry out. The wound will heal faster with a better cosmetic result if it is kept moist with ointment and covered with a bandage.   Do not let the wound dry out.   IMPORTANT INFORMATION ON REVERSE SIDE   Supplies Needed:   *Cotton tipped applicators (Q-tips)   *Polysporin ointment or Bacitracin ointment (NOT NEOSPORIN)   *Band-aids, or non stick gauze pads and micropore paper tape   PATIENT INFORMATION   During the healing process you will notice a number of changes. All wounds develop a small halo of redness surrounding the wound. This means healing is occurring. Severe itching with extensive redness usually indicates sensitivity to the ointment or bandage tape used to dress the wound. You should call our office if this develops.   Swelling and/or discoloration around your surgical site is common, particularly when performed around the eye.   All wounds normally drain. The larger the wound the more drainage there will be. After 7-10 days, you will notice the wound beginning to shrink and new skin will begin to grow. The wound is healed when you can see skin has formed over the entire area. A healed wound has a healthy, shiny look to the surface and is red to dark pink in color to normalize. Wounds may take approximately 4-6 weeks to heal. Larger wounds may take 6-8 weeks. After the wound is healed you may discontinue dressing changes.   You may experience a sensation of tightness as your wound heals. This is normal and will gradually subside.   Your healed wound may be sensitive to temperature changes. This sensitivity improves with time, but if you re having a lot of discomfort, try to avoid temperature extremes.   Patients frequently experience itching after their wound appears to have healed because of the continue healing under the skin. Plain Vaseline will help relieve the itching.                 Follow-ups after your visit        Your next 10 appointments  "already scheduled     Aug 25, 2017  2:30 PM CDT   MA SCREENING DIGITAL BILATERAL with W[facility]MA2   [facility] ([facility])    [address] [facility] [place] [facility] [place] [id number] [phone]           Do not use any powder, lotion or deodorant under your arms or on your breast. If you do, we will ask you to remove it before your exam.  Wear comfortable, two-piece clothing.  If you have any allergies, tell your care team.  Bring any previous mammograms from other facilities or have them mailed to the breast center. Three-dimensional (3D) mammograms are available at [place] locations in [facility], and [place]. [facility] locations include [place] and [facility] in [place]. Benefits of 3D mammograms include: - Improved rate of cancer detection - Decreases your chance of having to go back for more tests, which means fewer: - \"False-positive\" results (This means that there is an abnormal area but it isn't cancer.) - Invasive testing procedures, such as a biopsy or surgery - Can provide clearer images of the breast if you have dense breast tissue. 3D mammography is an optional exam that anyone can have with a 2D mammogram. It doesn't replace or take the place of a 2D mammogram. 2D mammograms remain an effective screening test for all women.  Not all insurance companies cover the cost of a 3D mammogram. Check with your insurance.              Who to contact     If you have questions or need follow up information about today's clinic visit or your schedule please contact [facility] directly at [phone].  Normal or non-critical lab and imaging results will be communicated to you by MyChart, letter or phone within 4 business days after the clinic has received the results. If you do not hear from us within 7 days, please contact the clinic through MyChart or phone. If you have a critical or abnormal lab result, we will notify " you by phone as soon as possible.  Submit refill requests through [employer] or call your pharmacy and they will forward the refill request to us. Please allow 3 business days for your refill to be completed.          Additional Information About Your Visit        TRX System[employer]art Information     [employer] gives you secure access to your electronic health record. If you see a primary care provider, you can also send messages to your care team and make appointments. If you have questions, please call your primary care clinic.  If you do not have a primary care provider, please call [phone] and they will assist you.        Care EveryWhere ID     This is your Care EveryWhere ID. This could be used by other organizations to access your [place] medical records  [id number]        Your Vitals Were     Pulse Pulse Oximetry                64 100%           Blood Pressure from Last 3 Encounters:   08/21/17 130/76   08/17/17 128/74   02/08/17 142/72    Weight from Last 3 Encounters:   08/17/17 71.2 kg (157 lb)   02/08/17 78.2 kg (172 lb 6.4 oz)   10/16/14 74.4 kg (164 lb)              Today, you had the following     No orders found for display       Primary Care Provider Office Phone # Fax #    Jaye Moorerobert Herrera [phone] [address] [place] [facility] [address]        Equal Access to Services     ADE METCALF : Hadii aad ku hadasho Soomaali, waaxda luqadaha, qaybta kaalmada adeegyada, gasper gonzalezin hayaan bharat abdullahi . So [facility] [phone].    ATENCIÓN: Si habla español, tiene a bess disposición servicios gratuitos de asistencia lingüística. Bridger al [phone].    We comply with applicable federal civil rights laws and [place] laws. We do not discriminate on the basis of race, color, national origin, age, disability sex, sexual orientation or gender identity.            Thank you!     Thank you for choosing [facility]  for your care. Our goal is always to provide you with excellent  care. Hearing back from our patients is one way we can continue to improve our services. Please take a few minutes to complete the written survey that you may receive in the mail after your visit with us. Thank you!             Your Updated Medication List - Protect others around you: Learn how to safely use, store and throw away your medicines at www.disposemymeds.org.          This list is accurate as of: 8/21/17  5:24 PM.  Always use your most recent med list.                   Brand Name Dispense Instructions for use Diagnosis    conjugated estrogens cream    PREMARIN    30 g    Place 0.5 g vaginally twice a week    Vaginal dryness, menopausal, Dyspareunia in female

## 2017-08-21 NOTE — NURSING NOTE
"Chief Complaint   Patient presents with     Derm Problem     skin check       Initial /76  Pulse 64  SpO2 100% Estimated body mass index is 27.16 kg/(m^2) as calculated from the following:    Height as of 8/17/17: 1.619 m (5' 3.75\").    Weight as of 8/17/17: 71.2 kg (157 lb).  BP completed using cuff size: mayra Rivers LPN    "

## 2017-08-23 LAB — COPATH REPORT: NORMAL

## 2017-08-25 ENCOUNTER — HOSPITAL ENCOUNTER (OUTPATIENT)
Dept: MAMMOGRAPHY | Facility: CLINIC | Age: 47
Discharge: HOME | End: 2017-08-25
Attending: FAMILY MEDICINE | Admitting: FAMILY MEDICINE
Payer: COMMERCIAL

## 2017-08-25 DIAGNOSIS — Z12.31 VISIT FOR SCREENING MAMMOGRAM: ICD-10-CM

## 2017-08-25 PROCEDURE — 77063 BREAST TOMOSYNTHESIS BI: CPT

## 2017-09-19 ENCOUNTER — OFFICE VISIT (OUTPATIENT)
Dept: DERMATOLOGY | Facility: CLINIC | Age: 47
End: 2017-09-19
Payer: COMMERCIAL

## 2017-09-19 VITALS — SYSTOLIC BLOOD PRESSURE: 126 MMHG | DIASTOLIC BLOOD PRESSURE: 79 MMHG | HEART RATE: 75 BPM | OXYGEN SATURATION: 97 %

## 2017-09-19 DIAGNOSIS — C44.612: ICD-10-CM

## 2017-09-19 DIAGNOSIS — L57.0 AK (ACTINIC KERATOSIS): Primary | ICD-10-CM

## 2017-09-19 PROCEDURE — 17000 DESTRUCT PREMALG LESION: CPT | Mod: 51 | Performed by: DERMATOLOGY

## 2017-09-19 PROCEDURE — 17311 MOHS 1 STAGE H/N/HF/G: CPT | Performed by: DERMATOLOGY

## 2017-09-19 NOTE — MR AVS SNAPSHOT
After Visit Summary   2017    Aixa Coker    MRN: [id number]           Patient Information     Date Of Birth          1970        Visit Information        Provider Department      2017 7:30 AM Ashok Lee MD [facility]        Today's Diagnoses     AK (actinic keratosis)    -  1    Basal cell carcinoma of skin of right wrist          Care Instructions    Open Wound Care     for ______________        ? No strenuous activity for 48 hours    ? Take Tylenol as needed for discomfort.                                                .         ? Do not drink alcoholic beverages for 48 hours.    ? Keep the pressure bandage in place for 24 hours. If the bandage becomes blood tinged or loose, reinforce it with gauze and tape.        (Refer to the reverse side of this page for management of bleeding).    ? Remove bandage in 24 hours and begin wound care as follows:     1. Clean area with tap water using a Q tip or gauze pad, (shower / bathe normally)  2. Dry wound with Q tip or gauze pad  3. Apply Aquaphor, Vaseline, Polysporin or Bacitracin Ointment with a Q tip    Do NOT use Neosporin Ointment *  4. Cover the wound with a band-aid or nonstick gauze pad and paper tape.  5. Repeat wound care once a day until wound is completely healed.    It is an old wives tale that a wound heals better when it is exposed to air and allowed to dry out. The wound will heal faster with a better cosmetic result if it is kept moist with ointment and covered with a bandage.  Do not let the wound dry out.      Supplies Needed:                Qtips or gauze pads                Polysporin or Bacitracin Ointment                Bandaids or nonstick gauze pads and paper tape    Wound care kits and brown paper tape are available for purchase at   the pharmacy.    BLEEDIN. Use tightly rolled up gauze or cloth to apply direct pressure over the bandage for 20   minutes.  2. Reapply pressure  for an additional 20 minutes if necessary  3. Call the office or go to the nearest emergency room if pressure fails to stop the bleeding.  4. Use additional gauze and tape to maintain pressure once the bleeding has stopped.  5. Begin wound care 24 hours after surgery as directed.                  WOUND HEALING    1. One week after surgery a pink / red halo will form around the outside of the wound.   This is new skin.  2. The center of the wound will appear yellowish white and produce some drainage.  3. The pink halo will slowly migrate in toward the center of the wound until the wound is covered with new shiny pink skin.  4. There will be no more drainage when the wound is completely healed.  5. It will take six months to one year for the redness to fade.  6. The scar may be itchy, tight and sensitive to extreme temperatures for a year after the surgery.  7. Massaging the area several times a day for several minutes after the wound is completely healed will help the scar soften and normalize faster. Begin massage only after healing is complete.      In case of emergency call: Dr Lee: [phone]     [facility]: [phone]    [facility]: [phone]              Follow-ups after your visit        Who to contact     If you have questions or need follow up information about today's clinic visit or your schedule please contact [facility] directly at [phone].  Normal or non-critical lab and imaging results will be communicated to you by MyChart, letter or phone within 4 business days after the clinic has received the results. If you do not hear from us within 7 days, please contact the clinic through MyChart or phone. If you have a critical or abnormal lab result, we will notify you by phone as soon as possible.  Submit refill requests through [employer] or call your pharmacy and they will forward the refill request to us. Please allow 3 business days for your refill to be  completed.          Additional Information About Your Visit        MyChart Information     [email or web address] gives you secure access to your electronic health record. If you see a primary care provider, you can also send messages to your care team and make appointments. If you have questions, please call your primary care clinic.  If you do not have a primary care provider, please call [phone] and they will assist you.        Care EveryWhere ID     This is your Care EveryWhere ID. This could be used by other organizations to access your [place] medical records  [id number]        Your Vitals Were     Pulse Pulse Oximetry                75 97%           Blood Pressure from Last 3 Encounters:   09/19/17 126/79   08/21/17 130/76   08/17/17 128/74    Weight from Last 3 Encounters:   08/17/17 71.2 kg (157 lb)   02/08/17 78.2 kg (172 lb 6.4 oz)   10/16/14 74.4 kg (164 lb)              We Performed the Following     DESTRUCT PREMALIGNANT LESION, FIRST     MOHS HEAD/NCK/HND/FT/GEN 1ST STAGE UP T0 5 BLOCKS        Primary Care Provider Office Phone # Fax #    Jaye Atkins DO Javier [phone] [address] [place] [address]        Equal Access to Services     ADE METCALF : Hadii markel ku hadasho Soomaali, waaxda luqadaha, qaybta kaalmada adeegyada, gasper ramos. So [facility] [phone].    ATENCIÓN: Si habla español, tiene a bess disposición servicios gratuitos de asistencia lingüística. Llame al [phone].    We comply with applicable federal civil rights laws and [place] laws. We do not discriminate on the basis of race, color, national origin, age, disability sex, sexual orientation or gender identity.            Thank you!     Thank you for choosing [facility]  for your care. Our goal is always to provide you with excellent care. Hearing back from our patients is one way we can continue to improve our services. Please take a few minutes to complete the written  survey that you may receive in the mail after your visit with us. Thank you!             Your Updated Medication List - Protect others around you: Learn how to safely use, store and throw away your medicines at www.disposemymeds.org.          This list is accurate as of: 9/19/17  8:32 AM.  Always use your most recent med list.                   Brand Name Dispense Instructions for use Diagnosis    conjugated estrogens cream    PREMARIN    30 g    Place 0.5 g vaginally twice a week    Vaginal dryness, menopausal, Dyspareunia in female

## 2017-09-19 NOTE — NURSING NOTE
Surgical Office Location :   [facility]  [address] [place] [address]

## 2017-09-19 NOTE — NURSING NOTE
"Initial /79  Pulse 75  SpO2 97% Estimated body mass index is 27.16 kg/(m^2) as calculated from the following:    Height as of 8/17/17: 1.619 m (5' 3.75\").    Weight as of 8/17/17: 71.2 kg (157 lb). .    Destiny Lazo LPN    "

## 2017-09-19 NOTE — PROGRESS NOTES
Aixa Coker is a 47 year old year old female patient here today for evaluation and managment of actinic keratosis on left cheek and basal cell carcinoma on right wrist. Patient reports the following modifying factors none.  Associated symptoms: none.  Patient has no other skin complaints today.  Remainder of the HPI, Meds, PMH, Allergies, FH, and SH was reviewed in chart.      Past Medical History:   Diagnosis Date     Basal cell carcinoma      Cancer (H)     Lip- basal cell carcinoma     Dyspareunia      Frequent UTI     Reports with increased intercourse     heavy menses     controlled with ablation       Past Surgical History:   Procedure Laterality Date     C ORAL SURGERY PROCEDURE  1988    [place] teeth     C/SECTION, LOW TRANSVERSE  11/99, 10/00     DILATE CERVIX, HYSTEROSCOPY, ABLATE ENDOMETRIUM NOVASURE, COMBINED  2010     TONSILLECTOMY & ADENOIDECTOMY  1976        Family History   Problem Relation Age of Onset     CANCER Father      squamous cell, skin     Prostate Cancer Father 68     DIABETES Father      C.A.D. Father      DIABETES Paternal Grandmother      DIABETES Sister      gestational diabetes     Respiratory Sister      asthma     Hypertension Mother      CANCER Mother      basal cell     OSTEOPOROSIS Mother        Social History     Social History     Marital status:      Spouse name: N/A     Number of children: 2     Years of education: N/A     Occupational History      [employer] 10     middle school, techinical support     Social History Main Topics     Smoking status: Never Smoker     Smokeless tobacco: Never Used     Alcohol use Yes      Comment: Avg. 1-2 drinks per week     Drug use: No     Sexual activity: Yes     Partners: Male     Birth control/ protection: Condom     Other Topics Concern     Parent/Sibling W/ Cabg, Mi Or Angioplasty Before 65f 55m? No     Social History Narrative       Outpatient Encounter Prescriptions as of 9/19/2017   Medication Sig Dispense  Refill     conjugated estrogens (PREMARIN) cream Place 0.5 g vaginally twice a week 30 g 12     No facility-administered encounter medications on file as of 9/19/2017.              Review Of Systems  Skin: As above  Eyes: negative  Ears/Nose/Throat: negative  Respiratory: No shortness of breath, dyspnea on exertion, cough, or hemoptysis  Cardiovascular: negative  Gastrointestinal: negative  Genitourinary: negative  Musculoskeletal: negative  Neurologic: negative  Psychiatric: negative  Hematologic/Lymphatic/Immunologic: negative  Endocrine: negative      O:   NAD, WDWN, Alert & Oriented, Mood & Affect wnl, Vitals stable   Here today alone   /79  Pulse 75  SpO2 97%   General appearance normal   Vitals stable   Alert, oriented and in no acute distress     L cheek gritty papule   R wrist 8mm scaly papule       Eyes: Conjunctivae/lids:Normal     ENT: Lips, buccal mucosa, tongue: normal    MSK:Normal    Cardiovascular: peripheral edema none    Pulm: Breathing Normal    Neuro/Psych: Orientation:Normal; Mood/Affect:Normal      A/P:  1. L cheek actinic keratosis   LN2:  Treated with LN2 for 5s for 1-2 cycles. Warned risks of blistering, pain, pigment change, scarring, and incomplete resolution.  Advised patient to return if lesions do not completely resolve.  Wound care sheet given.  2. R wrist basal cell carcinoma   MOHS:   location    After PGACAC discussed with patient, decision for Mohs surgery was made. Indication for Mohs was Location. Patient confirmed the site with Dr. Lee.  After anesthesia with LEC, the tumor was excised using standard Mohs technique in 1 stages(s).  CLEAR MARGINS OBTAINED and Final defect size was 1.3 cm.       REPAIR SECOND INTENT: We discussed the options for wound management in full with the patient including risks/benefits/possible outcomes. Decision made to allow the wound to heal by second intention. EBL minimal; complications none; wound care routine.  The patient was  discharged in good condition and will return in one month or prn for wound evaluation.    BENIGN LESIONS DISCUSSED WITH PATIENT:  I discussed the specifics of tumor, prognosis, and genetics of benign lesions.  I explained that treatment of these lesions would be purely cosmetic and not medically neccessary.  I discussed with patient different removal options including excision, cautery and /or laser.      Nature and genetics of benign skin lesions dicussed with patient.  Signs and Symptoms of skin cancer discussed with patient.  Patient encouraged to perform monthly skin exams.  UV precautions reviewed with patient.  Skin care regimen reviewed with patient: Eliminate harsh soaps, i.e. Dial, zest, irsih spring; Mild soaps such as Cetaphil or Dove sensitive skin, avoid hot or cold showers, aggressive use of emollients including vanicream, cetaphil or cerave discussed with patient.    Risks of non-melanoma skin cancer discussed with patient   Return to clinic 6 months

## 2017-09-19 NOTE — PATIENT INSTRUCTIONS
Open Wound Care     for ______________        ? No strenuous activity for 48 hours    ? Take Tylenol as needed for discomfort.                                                .         ? Do not drink alcoholic beverages for 48 hours.    ? Keep the pressure bandage in place for 24 hours. If the bandage becomes blood tinged or loose, reinforce it with gauze and tape.        (Refer to the reverse side of this page for management of bleeding).    ? Remove bandage in 24 hours and begin wound care as follows:     1. Clean area with tap water using a Q tip or gauze pad, (shower / bathe normally)  2. Dry wound with Q tip or gauze pad  3. Apply Aquaphor, Vaseline, Polysporin or Bacitracin Ointment with a Q tip    Do NOT use Neosporin Ointment *  4. Cover the wound with a band-aid or nonstick gauze pad and paper tape.  5. Repeat wound care once a day until wound is completely healed.    It is an old wives tale that a wound heals better when it is exposed to air and allowed to dry out. The wound will heal faster with a better cosmetic result if it is kept moist with ointment and covered with a bandage.  Do not let the wound dry out.      Supplies Needed:                Qtips or gauze pads                Polysporin or Bacitracin Ointment                Bandaids or nonstick gauze pads and paper tape    Wound care kits and brown paper tape are available for purchase at   the pharmacy.    BLEEDIN. Use tightly rolled up gauze or cloth to apply direct pressure over the bandage for 20   minutes.  2. Reapply pressure for an additional 20 minutes if necessary  3. Call the office or go to the nearest emergency room if pressure fails to stop the bleeding.  4. Use additional gauze and tape to maintain pressure once the bleeding has stopped.  5. Begin wound care 24 hours after surgery as directed.                  WOUND HEALING    1. One week after surgery a pink / red halo will form around the outside of the wound.   This is new  skin.  2. The center of the wound will appear yellowish white and produce some drainage.  3. The pink halo will slowly migrate in toward the center of the wound until the wound is covered with new shiny pink skin.  4. There will be no more drainage when the wound is completely healed.  5. It will take six months to one year for the redness to fade.  6. The scar may be itchy, tight and sensitive to extreme temperatures for a year after the surgery.  7. Massaging the area several times a day for several minutes after the wound is completely healed will help the scar soften and normalize faster. Begin massage only after healing is complete.      In case of emergency call: Dr Lee: [phone]     [facility]: [phone]    [facility]: [phone]

## 2017-11-07 ENCOUNTER — OFFICE VISIT (OUTPATIENT)
Dept: FAMILY MEDICINE | Facility: CLINIC | Age: 47
End: 2017-11-07
Payer: COMMERCIAL

## 2017-11-07 VITALS
BODY MASS INDEX: 28.41 KG/M2 | TEMPERATURE: 97.6 F | HEIGHT: 62 IN | DIASTOLIC BLOOD PRESSURE: 80 MMHG | HEART RATE: 88 BPM | WEIGHT: 154.4 LBS | SYSTOLIC BLOOD PRESSURE: 138 MMHG

## 2017-11-07 DIAGNOSIS — J06.9 VIRAL URI WITH COUGH: Primary | ICD-10-CM

## 2017-11-07 DIAGNOSIS — R07.0 THROAT PAIN: ICD-10-CM

## 2017-11-07 LAB
DEPRECATED S PYO AG THROAT QL EIA: NORMAL
SPECIMEN SOURCE: NORMAL

## 2017-11-07 PROCEDURE — 99213 OFFICE O/P EST LOW 20 MIN: CPT | Performed by: FAMILY MEDICINE

## 2017-11-07 PROCEDURE — 87880 STREP A ASSAY W/OPTIC: CPT | Performed by: FAMILY MEDICINE

## 2017-11-07 PROCEDURE — 87081 CULTURE SCREEN ONLY: CPT | Performed by: FAMILY MEDICINE

## 2017-11-07 NOTE — NURSING NOTE
"Initial /80  Pulse 88  Temp 97.6  F (36.4  C) (Tympanic)  Ht 5' 1.75\" (1.568 m)  Wt 154 lb 6.4 oz (70 kg)  Breastfeeding? No  BMI 28.47 kg/m2 Estimated body mass index is 28.47 kg/(m^2) as calculated from the following:    Height as of this encounter: 5' 1.75\" (1.568 m).    Weight as of this encounter: 154 lb 6.4 oz (70 kg). .      "

## 2017-11-07 NOTE — MR AVS SNAPSHOT
"              After Visit Summary   11/7/2017    Aixa Coker    MRN: [id number]           Patient Information     Date Of Birth          1970        Visit Information        Provider Department      11/7/2017 9:00 AM Jaye Herrera DO [facility]        Today's Diagnoses     Viral URI with cough    -  1    Throat pain           Follow-ups after your visit        Who to contact     Normal or non-critical lab and imaging results will be communicated to you by Blurbhart, letter or phone within 4 business days after the clinic has received the results. If you do not hear from us within 7 days, please contact the clinic through Blurbhart or phone. If you have a critical or abnormal lab result, we will notify you by phone as soon as possible.  Submit refill requests through [employer] or call your pharmacy and they will forward the refill request to us. Please allow 3 business days for your refill to be completed.          If you need to speak with a  for additional information , please call: [phone]           Additional Information About Your Visit        [employer] Information     [employer] gives you secure access to your electronic health record. If you see a primary care provider, you can also send messages to your care team and make appointments. If you have questions, please call your primary care clinic.  If you do not have a primary care provider, please call [phone] and they will assist you.        Care EveryWhere ID     This is your Care EveryWhere ID. This could be used by other organizations to access your [facility] medical records  [id number]        Your Vitals Were     Pulse Temperature Height Breastfeeding? BMI (Body Mass Index)       88 97.6  F (36.4  C) (Tympanic) 5' 1.75\" (1.568 m) No 28.47 kg/m2        Blood Pressure from Last 3 Encounters:   11/07/17 138/80   09/19/17 126/79   08/21/17 130/76    Weight from Last 3 Encounters:   11/07/17 154 lb 6.4 oz (70 " kg)   08/17/17 157 lb (71.2 kg)   02/08/17 172 lb 6.4 oz (78.2 kg)              We Performed the Following     Beta strep group A culture     Strep, Rapid Screen        Primary Care Provider Office Phone # Fax #    Jaye Herrera [phone] [address] [facility]   [place] [address]        Equal Access to Services     [facility] : Hadii aad ku hadasho Soomaali, waaxda luqadaha, qaybta kaalmada adeegyada, waxay idiin hayaan adeeg khcarlossh la'aan ah. So [facility] [phone].    ATENCIÓN: Si habla español, tiene a bess disposición servicios gratuitos de asistencia lingüística. Llame al [phone].    We comply with applicable federal civil rights laws and [place] laws. We do not discriminate on the basis of race, color, national origin, age, disability, sex, sexual orientation, or gender identity.            Thank you!     Thank you for choosing [facility]  for your care. Our goal is always to provide you with excellent care. Hearing back from our patients is one way we can continue to improve our services. Please take a few minutes to complete the written survey that you may receive in the mail after your visit with us. Thank you!             Your Updated Medication List - Protect others around you: Learn how to safely use, store and throw away your medicines at www.disposemymeds.org.          This list is accurate as of: 11/7/17 11:09 AM.  Always use your most recent med list.                   Brand Name Dispense Instructions for use Diagnosis    conjugated estrogens cream    PREMARIN    30 g    Place 0.5 g vaginally twice a week    Vaginal dryness, menopausal, Dyspareunia in female       MAGNESIUM PO      Take 2 tablets by mouth daily

## 2017-11-07 NOTE — LETTER
November 8, 2017      Aixa Coker  [address] [address]            The results of your recent throat culture were negative. If you have any further questions or concerns please contact the clinic.      Sincerely,        Jaye Herrera, DO

## 2017-11-07 NOTE — PROGRESS NOTES
"  SUBJECTIVE:   Aixa Coker is a 47 year old female who presents to clinic today for the following health issues:    ENT Symptoms             Symptoms: cc Present Absent Comment   Fever/Chills   x    Fatigue  x     Muscle Aches  x  Mild    Eye Irritation  x  Watery    Sneezing   x    Nasal [place]/Drg  x     Sinus Pressure/Pain  x     Loss of smell   x    Dental pain   x    Sore Throat x x  Feels dry/scratchy   Swollen Glands   x    Ear Pain/Fullness  x  Left ear pressure   Cough  x     Wheeze   x    Chest Pain   x    Shortness of breath   x    Rash   x    Other   x      Symptom duration:  10 days   Symptom severity:  mioderate   Treatments tried:  cough drops, theraflu, alkaseltzer   Contacts:  oliver Ulloa presents with a 10 day history of sore throat and dry cough. She's also been having a stuffy/runny nose and mild muscle aches in the last few days. She also has some left ear pressure but not pain. She denies fevers, chills, sweats, nausea, vomiting, diarrhea. She has tried theraflu, cough drops, and alkaseltzer plus which has not really helped her. 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    Problem list and histories reviewed & adjusted, as indicated.  Additional history: none      Reviewed and updated as needed this visit by clinical staffTobacco  Allergies  Meds  Med Hx  Surg Hx  Fam Hx  Soc Hx      Reviewed and updated as needed this visit by Provider  Tobacco  Med Hx  Surg Hx  Fam Hx  Soc Hx        ROS:  Negative except as noted above.    OBJECTIVE:     /80  Pulse 88  Temp 97.6  F (36.4  C) (Tympanic)  Ht 5' 1.75\" (1.568 m)  Wt 154 lb 6.4 oz (70 kg)  Breastfeeding? No  BMI 28.47 kg/m2  Body mass index is 28.47 kg/(m^2).    PHYSICAL EXAM  General: Well appearing woman, no acute distress.  CV: RRR, no murmurs, normal S1 and S2  Lungs: CTAB, no wheezes, no crackles, no rhonchi  Ears: nonerythematous ear canals, gray normal TM " bilaterally  Throat: Nonerythematous pharynx, no exudate, no swelling of pharynx  Neck: No lymphadenopathy    Diagnostic Test Results:  Strep screen - Negative    ASSESSMENT/PLAN:         ICD-10-CM    1. Viral URI with cough J06.9     B97.89    2. Throat pain R07.0 Strep, Rapid Screen     Beta strep group A culture       Artemio is a 46 yo woman who works at an elementary school who presents with a 10 day history of sore throat and dry cough. Her rapid strep test was negative and her symptoms align more with a lingering viral URI. A lingering cough could also be associated with a seasonal allergy.    -Continue home supportive care: theraflu, cough drops, warm teas   -Try a nondrowsy antihistamine   -If not getting better in a week, call back

## 2017-11-08 LAB
BACTERIA SPEC CULT: NORMAL
SPECIMEN SOURCE: NORMAL

## 2018-08-17 ENCOUNTER — OFFICE VISIT (OUTPATIENT)
Dept: FAMILY MEDICINE | Facility: CLINIC | Age: 48
End: 2018-08-17
Payer: COMMERCIAL

## 2018-08-17 VITALS
SYSTOLIC BLOOD PRESSURE: 110 MMHG | WEIGHT: 167 LBS | BODY MASS INDEX: 30.73 KG/M2 | HEIGHT: 62 IN | DIASTOLIC BLOOD PRESSURE: 67 MMHG | HEART RATE: 65 BPM | TEMPERATURE: 97.7 F

## 2018-08-17 DIAGNOSIS — M26.609 TMJ (TEMPOROMANDIBULAR JOINT SYNDROME): Primary | ICD-10-CM

## 2018-08-17 DIAGNOSIS — Z12.11 SPECIAL SCREENING FOR MALIGNANT NEOPLASMS, COLON: ICD-10-CM

## 2018-08-17 DIAGNOSIS — N94.10 DYSPAREUNIA IN FEMALE: ICD-10-CM

## 2018-08-17 DIAGNOSIS — K64.4 EXTERNAL HEMORRHOIDS: ICD-10-CM

## 2018-08-17 DIAGNOSIS — N95.1 VAGINAL DRYNESS, MENOPAUSAL: ICD-10-CM

## 2018-08-17 PROCEDURE — 99214 OFFICE O/P EST MOD 30 MIN: CPT | Performed by: PHYSICIAN ASSISTANT

## 2018-08-17 NOTE — MR AVS SNAPSHOT
After Visit Summary   8/17/2018    Aixa Coker    MRN: [id number]           Patient Information     Date Of Birth          1970        Visit Information        Provider Department      8/17/2018 1:00 PM Gracia Luu PA-C [facility]        Today's Diagnoses     TMJ (temporomandibular joint syndrome)    -  1    Vaginal dryness, menopausal        Dyspareunia in female        Special screening for malignant neoplasms, colon        External hemorrhoids          Care Instructions    To schedule the colonoscopy - call [phone]      Helping Your Temporomandibular Joint (TMJ) Heal  The temporomandibular joint (TMJ) is a ball-and-socket joint located where the upper and lower jaws meet. When the TMJ and related muscles are injured, they need time to heal. Self-care is very important. You can take steps to reduce pressure on the TMJ and speed healing.    Eating with care  Chewing strains the TMJ. When symptoms are bad, you may not be able to chew at all. To get you through times when your symptoms are at their worst, try these tips:    Choose soft foods. These include scrambled eggs, oatmeal, yogurt, quiche, tofu, soup, smoothies, pasta, fish, mashed potatoes, milkshakes, bananas, applesauce, gelatin, or ice cream.    Don t bite into hard foods. These include whole apples, carrots, and corn on the cob. Instead cut foods into bite-sized pieces.    Grind or finely chop meats and other tough foods. Try hamburger meat instead of steak.  Using ice and heat  Your healthcare provider may suggest using ice and heat. Ice helps reduce swelling and pain. Heat helps relax muscles, increasing blood flow.    Use a gel pack or cold pack for severe pain. Apply for 10 to 20 minutes. Repeat as needed. To make a cold pack, put ice cubes in a plastic bag that seals at the top. Wrap the bag in a clean, thin towel or cloth. Never put ice or a cold pack directly on the skin.    Use moist heat  for mild to moderate muscle pain. Apply a moist, warm towel to the muscles for 10 to 20 minutes. Repeat as needed.  Staying away from triggers  Certain activities (called triggers) strain the TMJ, making symptoms worse. The tips below can help you avoid common triggers and limit strain.    Don t eat hard or chewy foods. These include nuts, pretzels, popcorn, chips, gum, caramel, gummy candies, carrots, whole apples, hard breads, and even ice.    Reschedule routine dental visits, like cleanings, if your jaw aches. If you have severe pain, call your healthcare provider.    Support your jaw when yawning. When you feel a yawn coming on, put a fist under your jaw. Apply gentle pressure. This helps prevent wide, painful yawns.    Don t do any activity that hurts. This includes nail biting, yelling, and singing.  Maintaining good posture  Work at improving your posture during the day and when you sleep. Good posture can help your body heal. Try these tips:    Use a headset when on the phone. Don t cradle the phone with your shoulder.    Keep ergonomics in mind. This includes making sure your workstation fits your body. Support your lower back. Take breaks often to stretch and rest. If you use a computer, keep the monitor at eye level.    Keep your head in a neutral position.  Keep your ears in line with your shoulders. Don t slouch or crane your head forward.    Use an orthopedic pillow. Use this to support your head and neck during sleep.  Date Last Reviewed: 8/1/2017 2000-2017 [employer]. [address] [place] [address]. All rights reserved. This information is not intended as a substitute for professional medical care. Always follow your healthcare professional's instructions.        When You Have Temporomandibular Disorder (TMD)  The temporomandibular joint (TMJ) is a ball-and-socket joint located where the upper and lower jaws meet. The TMJ and its nearby muscles make up a complex, loosely  connected system. Because of this, a problem in one part of the system can affect the other parts. This can cause you to have temporomandibular disorder (TMD).         How the temporomandibular joint works  You have 1 joint on each side of your mouth that together make up the TMJ. These joints are part of a large group of muscles, ligaments, and bones that work together as a system. When the system is healthy, you can talk, chew, and even yawn in comfort. Muscles contract and relax to open and close the joint. The disk is made of cartilage and is located between the condyle and the skull. It absorbs pressure in the joint and allows the jaws to open and close smoothly. Fluid (called synovial fluid) lubricates the joints. Ligaments connect the lower jaw bones to the skull. They also support the joint.  Common temporomandibular problems  When there is a problem with the TMJ and its related system, you can develop TMD. Common TMD problems include tight muscles, inflamed joints, and damaged joints.  In some cases, symptoms may be related to the teeth or bite.  Tight muscles  The muscles surrounding the TMJ can tighten (spasm) and cause pain.    Referred pain happens in a part of the body separate from the source of the problem. For example, pain in the face or teeth could be coming from a problem in the TMJ.    Myofascial pain happens in soft tissues, like muscle. Trigger points in these pain areas often cause referred pain. You may feel jaw, neck, or shoulder pain.  Inflamed joints  Inflammation may include pain, redness, heat, swelling, or loss of function.    Synovitis happens when certain tissues surrounding the TMJ become inflamed. It causes pain that increases with jaw movement.    Inflamed ligaments can be caused by strain or injury. When this happens, the ligaments are unable to support the joint.    Rheumatoid arthritis is a joint disease. It leads to inflammation in the TMJ.  Damaged joints  Many people hear  clicking when their jaw moves. If you feel pain along with the noise, the joint may be damaged.    Impingement happens when the disk slips out of place (displacement). This causes the jaw to catch. As the disk slips, you may hear a clicking sound.    Locked jaw happens when the disk gets stuck in one position. As a result, the jaw locks open or closed.    Osteoarthritis is a joint disease. It causes the TMJ to wear away (degenerate). This leads to pain during movement.  Other problems  The parts of the jaw and mouth make up a single unit. That s why a problem in 1 area can cause symptoms elsewhere. Teeth or bite problems linked to TMD include:    Grinding your teeth side to side (bruxism)    Biting down on your teeth (clenching)    When the teeth or bite is out of alignment (malocclusion)  Your healthcare provider will give you more information about these problems if needed.   Date Last Reviewed: 8/1/2017 2000-2017 [employer]. [address] [place] [address]. All rights reserved. This information is not intended as a substitute for professional medical care. Always follow your healthcare professional's instructions.                Follow-ups after your visit        Additional Services     GASTROENTEROLOGY ADULT REF PROCEDURE ONLY       Last Lab Result: Creatinine (mg/dL)       Date                     Value                 06/14/2006               0.92             ----------  Body mass index is 30.79 kg/(m^2).     Needed:  No  Language:  English    Patient will be contacted to schedule procedure.     Please be aware that coverage of these services is subject to the terms and limitations of your health insurance plan.  Call member services at your health plan with any benefit or coverage questions.  Any procedures must be performed at a [place] facility OR coordinated by your clinic's referral office.    Please bring the following with you to your appointment:    (1) Any  "X-Rays, CTs or MRIs which have been performed.  Contact the facility where they were done to arrange for  prior to your scheduled appointment.    (2) List of current medications   (3) This referral request   (4) Any documents/labs given to you for this referral                  Your next 10 appointments already scheduled     Aug 27, 2018  4:00 PM CDT   MA SCREENING BILATERAL W/ WILLY with [id number]   [facility] ([facility])    [address]  [facility] [address]   [phone]           Three-dimensional (3D) mammograms are available at [facility] locations in [place], [place], [place], [place], [facility], [place], [place], and [place]. [facility] locations include [place] and [facility] in [place]. Benefits of 3D mammograms include: - Improved rate of cancer detection - Decreases your chance of having to go back for more tests, which means fewer: - \"False-positive\" results (This means that there is an abnormal area but it isn't cancer.) - Invasive testing procedures, such as a biopsy or surgery - Can provide clearer images of the breast if you have dense breast tissue. 3D mammography is an optional exam that anyone can have with a 2D mammogram. It doesn't replace or take the place of a 2D mammogram. 2D mammograms remain an effective screening test for all women.  Not all insurance companies cover the cost of a 3D mammogram. Check with your insurance.              Future tests that were ordered for you today     Open Future Orders        Priority Expected Expires Ordered    MA Screen Bilateral w/Willy Routine  8/16/2019 8/16/2018            Who to contact     Normal or non-critical lab and imaging results will be communicated to you by MyChart, letter or phone within 4 business days after the clinic has received the results. If you do not hear from us within 7 days, please contact the clinic through MyChart or phone. If you have a critical or abnormal " "lab result, we will notify you by phone as soon as possible.  Submit refill requests through [employer] or call your pharmacy and they will forward the refill request to us. Please allow 3 business days for your refill to be completed.          If you need to speak with a  for additional information , please call: [phone]             Additional Information About Your Visit        [employer] Information     [employer] gives you secure access to your electronic health record. If you see a primary care provider, you can also send messages to your care team and make appointments. If you have questions, please call your primary care clinic.  If you do not have a primary care provider, please call [phone] and they will assist you.        Care EveryWhere ID     This is your Care EveryWhere ID. This could be used by other organizations to access your [place] medical records  [id number]        Your Vitals Were     Pulse Temperature Height BMI (Body Mass Index)          65 97.7  F (36.5  C) (Tympanic) 5' 1.75\" (1.568 m) 30.79 kg/m2         Blood Pressure from Last 3 Encounters:   08/17/18 110/67   11/07/17 138/80   09/19/17 126/79    Weight from Last 3 Encounters:   08/17/18 167 lb (75.8 kg)   11/07/17 154 lb 6.4 oz (70 kg)   08/17/17 157 lb (71.2 kg)              We Performed the Following     GASTROENTEROLOGY ADULT REF PROCEDURE ONLY          Where to get your medicines      These medications were sent to [facility] - [address]  [address] [place] [address]     Phone:  [phone]     conjugated estrogens cream          Primary Care Provider Office Phone # Fax #    Jaye Moorerobert Herrera [phone] [address] [place] [address]        Equal Access to Services     ADE METCALF AH: Hadii markel trujilloo [place], waaxda luqadaha, qaybta kaalmada adeegyada, gasper ramos. So [facility] [phone].    ATENCIÓN: " Si habla hemalatha, tiene a bess disposición servicios gratuitos de asistencia lingüística. [place] [phone].    We comply with applicable federal civil rights laws and [place] laws. We do not discriminate on the basis of race, color, national origin, age, disability, sex, sexual orientation, or gender identity.            Thank you!     Thank you for choosing [facility]  for your care. Our goal is always to provide you with excellent care. Hearing back from our patients is one way we can continue to improve our services. Please take a few minutes to complete the written survey that you may receive in the mail after your visit with us. Thank you!             Your Updated Medication List - Protect others around you: Learn how to safely use, store and throw away your medicines at www.disposemymeds.org.          This list is accurate as of 8/17/18  1:49 PM.  Always use your most recent med list.                   Brand Name Dispense Instructions for use Diagnosis    conjugated estrogens cream   Start taking on:  8/20/2018    PREMARIN    30 g    Place 0.5 g vaginally twice a week    Vaginal dryness, menopausal, Dyspareunia in female       MAGNESIUM PO      Take 2 tablets by mouth daily

## 2018-08-17 NOTE — PATIENT INSTRUCTIONS
To schedule the colonoscopy - call [phone]      Helping Your Temporomandibular Joint (TMJ) Heal  The temporomandibular joint (TMJ) is a ball-and-socket joint located where the upper and lower jaws meet. When the TMJ and related muscles are injured, they need time to heal. Self-care is very important. You can take steps to reduce pressure on the TMJ and speed healing.    Eating with care  Chewing strains the TMJ. When symptoms are bad, you may not be able to chew at all. To get you through times when your symptoms are at their worst, try these tips:    Choose soft foods. These include scrambled eggs, oatmeal, yogurt, quiche, tofu, soup, smoothies, pasta, fish, mashed potatoes, milkshakes, bananas, applesauce, gelatin, or ice cream.    Don t bite into hard foods. These include whole apples, carrots, and corn on the cob. Instead cut foods into bite-sized pieces.    Grind or finely chop meats and other tough foods. Try hamburger meat instead of steak.  Using ice and heat  Your healthcare provider may suggest using ice and heat. Ice helps reduce swelling and pain. Heat helps relax muscles, increasing blood flow.    Use a gel pack or cold pack for severe pain. Apply for 10 to 20 minutes. Repeat as needed. To make a cold pack, put ice cubes in a plastic bag that seals at the top. Wrap the bag in a clean, thin towel or cloth. Never put ice or a cold pack directly on the skin.    Use moist heat for mild to moderate muscle pain. Apply a moist, warm towel to the muscles for 10 to 20 minutes. Repeat as needed.  Staying away from triggers  Certain activities (called triggers) strain the TMJ, making symptoms worse. The tips below can help you avoid common triggers and limit strain.    Don t eat hard or chewy foods. These include nuts, pretzels, popcorn, chips, gum, caramel, gummy candies, carrots, whole apples, hard breads, and even ice.    Reschedule routine dental visits, like cleanings, if your jaw aches. If you have  severe pain, call your healthcare provider.    Support your jaw when yawning. When you feel a yawn coming on, put a fist under your jaw. Apply gentle pressure. This helps prevent wide, painful yawns.    Don t do any activity that hurts. This includes nail biting, yelling, and singing.  Maintaining good posture  Work at improving your posture during the day and when you sleep. Good posture can help your body heal. Try these tips:    Use a headset when on the phone. Don t cradle the phone with your shoulder.    Keep ergonomics in mind. This includes making sure your workstation fits your body. Support your lower back. Take breaks often to stretch and rest. If you use a computer, keep the monitor at eye level.    Keep your head in a neutral position.  Keep your ears in line with your shoulders. Don t slouch or crane your head forward.    Use an orthopedic pillow. Use this to support your head and neck during sleep.  Date Last Reviewed: 8/1/2017 2000-2017 [employer]. [address] [place] [address]. All rights reserved. This information is not intended as a substitute for professional medical care. Always follow your healthcare professional's instructions.        When You Have Temporomandibular Disorder (TMD)  The temporomandibular joint (TMJ) is a ball-and-socket joint located where the upper and lower jaws meet. The TMJ and its nearby muscles make up a complex, loosely connected system. Because of this, a problem in one part of the system can affect the other parts. This can cause you to have temporomandibular disorder (TMD).         How the temporomandibular joint works  You have 1 joint on each side of your mouth that together make up the TMJ. These joints are part of a large group of muscles, ligaments, and bones that work together as a system. When the system is healthy, you can talk, chew, and even yawn in comfort. Muscles contract and relax to open and close the joint. The disk is  made of cartilage and is located between the condyle and the skull. It absorbs pressure in the joint and allows the jaws to open and close smoothly. Fluid (called synovial fluid) lubricates the joints. Ligaments connect the lower jaw bones to the skull. They also support the joint.  Common temporomandibular problems  When there is a problem with the TMJ and its related system, you can develop TMD. Common TMD problems include tight muscles, inflamed joints, and damaged joints.  In some cases, symptoms may be related to the teeth or bite.  Tight muscles  The muscles surrounding the TMJ can tighten (spasm) and cause pain.    Referred pain happens in a part of the body separate from the source of the problem. For example, pain in the face or teeth could be coming from a problem in the TMJ.    Myofascial pain happens in soft tissues, like muscle. Trigger points in these pain areas often cause referred pain. You may feel jaw, neck, or shoulder pain.  Inflamed joints  Inflammation may include pain, redness, heat, swelling, or loss of function.    Synovitis happens when certain tissues surrounding the TMJ become inflamed. It causes pain that increases with jaw movement.    Inflamed ligaments can be caused by strain or injury. When this happens, the ligaments are unable to support the joint.    Rheumatoid arthritis is a joint disease. It leads to inflammation in the TMJ.  Damaged joints  Many people hear clicking when their jaw moves. If you feel pain along with the noise, the joint may be damaged.    Impingement happens when the disk slips out of place (displacement). This causes the jaw to catch. As the disk slips, you may hear a clicking sound.    Locked jaw happens when the disk gets stuck in one position. As a result, the jaw locks open or closed.    Osteoarthritis is a joint disease. It causes the TMJ to wear away (degenerate). This leads to pain during movement.  Other problems  The parts of the jaw and mouth make up  a single unit. That s why a problem in 1 area can cause symptoms elsewhere. Teeth or bite problems linked to TMD include:    Grinding your teeth side to side (bruxism)    Biting down on your teeth (clenching)    When the teeth or bite is out of alignment (malocclusion)  Your healthcare provider will give you more information about these problems if needed.   Date Last Reviewed: 8/1/2017 2000-2017 [employer]. [address] [place] [address], [place] [address]. All rights reserved. This information is not intended as a substitute for professional medical care. Always follow your healthcare professional's instructions.

## 2018-08-17 NOTE — PROGRESS NOTES
SUBJECTIVE:   Aixa Coker is a 48 year old female who presents to clinic today for the following health issues:      ENT Symptoms             Symptoms: cc Present Absent Comment   Fever/Chills   x    Fatigue   x    Muscle Aches   x    Eye Irritation   x    Sneezing   x    Nasal Elgin/Drg   x    Sinus Pressure/Pain   x    Loss of smell   x    Dental pain  x  Jaw pain on both sides, worse in the morning    Sore Throat   x    Swollen Glands   x    Ear Pain/Fullness  x  Left ear, itching    Cough   x    Wheeze   x    Chest Pain   x    Shortness of breath   x    Rash   x    Other   x      Symptom duration:  2-3 months    Symptom severity:  mild-moderate    Treatments tried:  Ibuprofen and Aleve   Contacts:  None     Left ear itchy, not really painful but feels like she is always picking at it    Waking up with pain on both sides of her jaw  Dentist mentioned TMJ but she doesn't thinks she grinds or clenches her teeth  Seems to improve a little as the day goes on    Would like the script for premarin again as she never started it  Still having issues with dryness    -Talk about getting a colonoscopy now that she is 48.   Has external hemorrhoids  Wondering if it's okay to get a colonoscopy with hemorrhoids  Problem list and histories reviewed & adjusted, as indicated.  Additional history: as documented    Current Outpatient Prescriptions   Medication Sig Dispense Refill     MAGNESIUM PO Take 2 tablets by mouth daily       conjugated estrogens (PREMARIN) cream Place 0.5 g vaginally twice a week (Patient not taking: Reported on 11/7/2017) 30 g 12     No Known Allergies  BP Readings from Last 3 Encounters:   08/17/18 110/67   11/07/17 138/80   09/19/17 126/79    Wt Readings from Last 3 Encounters:   08/17/18 167 lb (75.8 kg)   11/07/17 154 lb 6.4 oz (70 kg)   08/17/17 157 lb (71.2 kg)                    Reviewed and updated as needed this visit by clinical staff       Reviewed and updated as needed this visit by  "Provider         ROS:  Remainder of ROS obtained and found to be negative other than that which was documented above      OBJECTIVE:     /67  Pulse 65  Temp 97.7  F (36.5  C) (Tympanic)  Ht 5' 1.75\" (1.568 m)  Wt 167 lb (75.8 kg)  BMI 30.79 kg/m2  Body mass index is 30.79 kg/(m^2).  GENERAL: healthy, alert and no distress  EARS: ear canal and TM on right normal. Ear canal on left okay, cerumen impaction blocking TM. AFter irrigation, cerumen removed and TM with normal appearance No other significant past medical history or family history. Scaling or redness in skin  JAW: clicking noted over right TMJ. Mild tenderness b/l    Diagnostic Test Results:  none     ASSESSMENT/PLAN:     (M26.609) TMJ (temporomandibular joint syndrome)  (primary encounter diagnosis)  Comment: suspect TMJ issues given worse in morning and location of pain  Plan: discussed treatment of TMJ, foods to avoid, night guards to try    (N95.1) Vaginal dryness, menopausal  Comment:   Plan: conjugated estrogens (PREMARIN) cream            (N94.10) Dyspareunia in female  Comment:   Plan: conjugated estrogens (PREMARIN) cream            (Z12.11) Special screening for malignant neoplasms, colon  Comment:   Plan: GASTROENTEROLOGY ADULT REF PROCEDURE ONLY            (K64.4) External hemorrhoids  Comment:   Plan: continue over the counter management. If sx persist, can call for prescription meds        Gracia Luu PA-C  [facility]    "

## 2018-09-05 ENCOUNTER — HOSPITAL ENCOUNTER (OUTPATIENT)
Dept: MAMMOGRAPHY | Facility: CLINIC | Age: 48
Discharge: HOME | End: 2018-09-05
Attending: PHYSICIAN ASSISTANT | Admitting: PHYSICIAN ASSISTANT
Payer: COMMERCIAL

## 2018-09-05 DIAGNOSIS — Z12.31 VISIT FOR SCREENING MAMMOGRAM: ICD-10-CM

## 2018-09-05 PROCEDURE — 77067 SCR MAMMO BI INCL CAD: CPT

## 2018-10-21 ENCOUNTER — HEALTH MAINTENANCE LETTER (OUTPATIENT)
Age: 48
End: 2018-10-21

## 2019-06-21 ENCOUNTER — OFFICE VISIT (OUTPATIENT)
Dept: FAMILY MEDICINE | Facility: CLINIC | Age: 49
End: 2019-06-21
Payer: COMMERCIAL

## 2019-06-21 VITALS
HEIGHT: 62 IN | RESPIRATION RATE: 14 BRPM | TEMPERATURE: 97.7 F | HEART RATE: 74 BPM | SYSTOLIC BLOOD PRESSURE: 123 MMHG | DIASTOLIC BLOOD PRESSURE: 73 MMHG | WEIGHT: 170 LBS | BODY MASS INDEX: 31.28 KG/M2

## 2019-06-21 DIAGNOSIS — Z00.00 ROUTINE GENERAL MEDICAL EXAMINATION AT A HEALTH CARE FACILITY: Primary | ICD-10-CM

## 2019-06-21 DIAGNOSIS — N89.5 VAGINAL ADHESIONS, ACQUIRED: ICD-10-CM

## 2019-06-21 PROCEDURE — 99396 PREV VISIT EST AGE 40-64: CPT | Mod: 25 | Performed by: PHYSICIAN ASSISTANT

## 2019-06-21 PROCEDURE — 90471 IMMUNIZATION ADMIN: CPT | Performed by: PHYSICIAN ASSISTANT

## 2019-06-21 PROCEDURE — 90714 TD VACC NO PRESV 7 YRS+ IM: CPT | Performed by: PHYSICIAN ASSISTANT

## 2019-06-21 ASSESSMENT — PAIN SCALES - GENERAL: PAINLEVEL: NO PAIN (0)

## 2019-06-21 ASSESSMENT — MIFFLIN-ST. JEOR: SCORE: 1335.39

## 2019-06-21 NOTE — PATIENT INSTRUCTIONS
To schedule with OB/GYN: call [facility] [phone]         Preventive Health Recommendations  Female Ages 50 - 64    Yearly exam: See your health care provider every year in order to  o Review health changes.   o Discuss preventive care.    o Review your medicines if your doctor has prescribed any.      Get a Pap test every three years (unless you have an abnormal result and your provider advises testing more often).    If you get Pap tests with HPV test, you only need to test every 5 years, unless you have an abnormal result.     You do not need a Pap test if your uterus was removed (hysterectomy) and you have not had cancer.    You should be tested each year for STDs (sexually transmitted diseases) if you're at risk.     Have a mammogram every 1 to 2 years.    Have a colonoscopy at age 50, or have a yearly FIT test (stool test). These exams screen for colon cancer.      Have a cholesterol test every 5 years, or more often if advised.    Have a diabetes test (fasting glucose) every three years. If you are at risk for diabetes, you should have this test more often.     If you are at risk for osteoporosis (brittle bone disease), think about having a bone density scan (DEXA).    Shots: Get a flu shot each year. Get a tetanus shot every 10 years.    Nutrition:     Eat at least 5 servings of fruits and vegetables each day.    Eat whole-grain bread, whole-wheat pasta and brown rice instead of white grains and rice.    Get adequate Calcium and Vitamin D.     Lifestyle    Exercise at least 150 minutes a week (30 minutes a day, 5 days a week). This will help you control your weight and prevent disease.    Limit alcohol to one drink per day.    No smoking.     Wear sunscreen to prevent skin cancer.     See your dentist every six months for an exam and cleaning.    See your eye doctor every 1 to 2 years.

## 2019-06-21 NOTE — PROGRESS NOTES
"   SUBJECTIVE:   CC: Aixa Coker is an 49 year old woman who presents for preventive health visit.     Healthy Habits:    Do you get at least three servings of calcium containing foods daily (dairy, green leafy vegetables, etc.)? no, taking calcium and/or vitamin D supplement: no    Amount of exercise or daily activities, outside of work: None    Problems taking medications regularly No    Medication side effects: No    Have you had an eye exam in the past two years? no    Do you see a dentist twice per year? yes    Do you have sleep apnea, excessive snoring or daytime drowsiness?    yes, wanting to maybe talk about a sleeping aid    - Has purchased over the counter sleep medication but has not tried it as she is nervous, not sure if it is safe  - getting up often and having a hard time falling back to sleep    -Has a concern with an ache in her lower left pelvic area. Not shooting pain. Happening a few times a week.   Feels more like something is \"squeezing\" her on her left lower side    - Still has not yet started the premarin  Not having sex - too painful    Today's PHQ-2 Score:   PHQ-2 ( 1999 [employer]) 6/21/2019 8/17/2017   Q1: Little interest or pleasure in doing things 0 0   Q2: Feeling down, depressed or hopeless 0 0   PHQ-2 Score 0 0       Abuse: Current or Past(Physical, Sexual or Emotional)- No  Do you feel safe in your environment? Yes    Social History     Tobacco Use     Smoking status: Never Smoker     Smokeless tobacco: Never Used   Substance Use Topics     Alcohol use: Yes     Comment: Avg. 1-2 drinks per week     If you drink alcohol do you typically have >3 drinks per day or >7 drinks per week? No                     Reviewed orders with patient.  Reviewed health maintenance and updated orders accordingly - Yes  Labs reviewed in EPIC  BP Readings from Last 3 Encounters:   06/21/19 123/73   08/17/18 110/67   11/07/17 138/80    Wt Readings from Last 3 Encounters:   06/21/19 77.1 kg (170 lb) " "  08/17/18 75.8 kg (167 lb)   11/07/17 70 kg (154 lb 6.4 oz)                    Mammogram Screening: Patient over age 50, mutual decision to screen reflected in health maintenance.    Pertinent mammograms are reviewed under the imaging tab.  History of abnormal Pap smear: NO - age 30-65 PAP every 5 years with negative HPV co-testing recommended  PAP / HPV 8/15/2013 8/15/2011 7/19/2010   PAP NIL NIL NIL     Reviewed and updated as needed this visit by clinical staff  Tobacco  Allergies  Meds  Med Hx  Surg Hx  Fam Hx  Soc Hx        Reviewed and updated as needed this visit by Provider  Tobacco  Allergies  Meds  Med Hx  Surg Hx  Fam Hx  Soc Hx           ROS:  CONSTITUTIONAL: NEGATIVE for fever, chills, change in weight  INTEGUMENTARY/SKIN: NEGATIVE for worrisome rashes, moles or lesions  EYES: NEGATIVE for vision changes or irritation  ENT: NEGATIVE for ear, mouth and throat problems  RESP: NEGATIVE for significant cough or SOB  BREAST: NEGATIVE for masses, tenderness or discharge  CV: NEGATIVE for chest pain, palpitations or peripheral edema  GI: NEGATIVE for nausea, abdominal pain, heartburn, or change in bowel habits  : NEGATIVE for unusual urinary or vaginal symptoms. No vaginal bleeding.  MUSCULOSKELETAL: NEGATIVE for significant arthralgias or myalgia  NEURO: NEGATIVE for weakness, dizziness or paresthesias  PSYCHIATRIC: NEGATIVE for changes in mood or affect     OBJECTIVE:   /73   Pulse 74   Temp 97.7  F (36.5  C) (Tympanic)   Resp 14   Ht 1.568 m (5' 1.75\")   Wt 77.1 kg (170 lb)   BMI 31.35 kg/m    EXAM:  GENERAL: healthy, alert and no distress  EYES: Eyes grossly normal to inspection, PERRL and conjunctivae and sclerae normal  HENT: ear canals and TM's normal, nose and mouth without ulcers or lesions  NECK: no adenopathy, no asymmetry, masses, or scars and thyroid normal to palpation  RESP: lungs clear to auscultation - no rales, rhonchi or wheezes  BREAST: normal without masses, " "tenderness or nipple discharge and no palpable axillary masses or adenopathy  CV: regular rate and rhythm, normal S1 S2, no S3 or S4, no murmur, click or rub, no peripheral edema and peripheral pulses strong  ABDOMEN: soft, nontender, no hepatosplenomegaly, no masses and bowel sounds normal   (female): adhesion of skin tissue noted over superior vaginal opening with atrophy and white changes noted inferiorly. Unable to get speculum in for exam  MS: no gross musculoskeletal defects noted, no edema  SKIN: no suspicious lesions or rashes  NEURO: Normal strength and tone, mentation intact and speech normal  PSYCH: mentation appears normal, affect normal/bright    Diagnostic Test Results:  Labs reviewed in Epic    ASSESSMENT/PLAN:   (Z00.00) Routine general medical examination at a health care facility  (primary encounter diagnosis)  Comment:   Plan: ADMIN 1st VACCINE            (N89.5) Vaginal adhesions, acquired  Comment: unable to perform GYN exam due to narrowing of opening secondary to adhesions. Also with some atrophic changes. Would like her to see GYN so that they can evaluate   Plan: OB/GYN REFERRAL              COUNSELING:   Reviewed preventive health counseling, as reflected in patient instructions       Regular exercise       Healthy diet/nutrition       Colon cancer screening - she will be scheduling to be done this summer    Estimated body mass index is 31.35 kg/m  as calculated from the following:    Height as of this encounter: 1.568 m (5' 1.75\").    Weight as of this encounter: 77.1 kg (170 lb).    Weight management plan: Discussed healthy diet and exercise guidelines     reports that she has never smoked. She has never used smokeless tobacco.      Counseling Resources:  ATP IV Guidelines  Pooled Cohorts Equation Calculator  Breast Cancer Risk Calculator  FRAX Risk Assessment  ICSI Preventive Guidelines  Dietary Guidelines for Americans, 2010  USDA's MyPlate  ASA Prophylaxis  Lung CA " Screening    Gracia Luu PA-C  [facility]

## 2019-07-22 ENCOUNTER — ANESTHESIA EVENT (OUTPATIENT)
Dept: GASTROENTEROLOGY | Facility: CLINIC | Age: 49
End: 2019-07-22
Payer: COMMERCIAL

## 2019-07-22 NOTE — ANESTHESIA PREPROCEDURE EVALUATION
Anesthesia Pre-Procedure Evaluation    Patient: Aixa Coker   MRN: [id number] : 1970          Preoperative Diagnosis: screening    Procedure(s):  COLONOSCOPY    Past Medical History:   Diagnosis Date     Basal cell carcinoma      Cancer (H)     Lip- basal cell carcinoma     Dyspareunia      Frequent UTI     Reports with increased intercourse     heavy menses     controlled with ablation     Past Surgical History:   Procedure Laterality Date     C ORAL SURGERY PROCEDURE      [place] teeth     C/SECTION, LOW TRANSVERSE  , 10/00     DILATE CERVIX, HYSTEROSCOPY, ABLATE ENDOMETRIUM NOVASURE, COMBINED       TONSILLECTOMY & ADENOIDECTOMY         Anesthesia Evaluation     . Pt has had prior anesthetic. Type: General, Regional and MAC    No history of anesthetic complications          ROS/MED HX    ENT/Pulmonary:  - neg pulmonary ROS     Neurologic:  - neg neurologic ROS     Cardiovascular:  - neg cardiovascular ROS       METS/Exercise Tolerance:  >4 METS   Hematologic:  - neg hematologic  ROS       Musculoskeletal: Comment: Inflammatory polyarthropathy       Arthritis: Inflammatory polyarthropathy.   GI/Hepatic: Comment: hemorrhoids    (+) Other GI/Hepatic Hematochezia      Renal/Genitourinary: Comment: Frequent UTI        Endo:  - neg endo ROS       Psychiatric:  - neg psychiatric ROS       Infectious Disease:  - neg infectious disease ROS       Malignancy:   (+) Malignancy History of Skin  BCC        Other: Comment: Heavy menses  Dyspareunia  vulvar pain  Decreased libido                           Physical Exam  Normal systems: cardiovascular, pulmonary and dental    Airway   Mallampati: I  TM distance: >3 FB  Neck ROM: full    Dental     Cardiovascular       Pulmonary             Lab Results   Component Value Date    WBC 7.3 2007    HGB 12.8 2011    HCT 39.2 2007     2007    CRP 5.0 2007    SED 21 (H) 2007     2006    POTASSIUM 4.2  "06/14/2006    CHLORIDE 106 06/14/2006    CO2 29 06/14/2006    BUN 12 06/14/2006    CR 0.92 06/14/2006    GLC 97 08/17/2017    RASHI 9.1 06/14/2006    TSH 3.50 08/18/2011    T4 0.79 08/18/2011    HCG  09/22/2010     Negative   This test provides a presumptive diagnosis of pregnancy or non-pregnancy. A   confirmed pregnancy diagnosis should only be made by a physician after all   clinical and laboratory findings have been evaluated.       Preop Vitals  BP Readings from Last 3 Encounters:   06/21/19 123/73   08/17/18 110/67   11/07/17 138/80    Pulse Readings from Last 3 Encounters:   06/21/19 74   08/17/18 65   11/07/17 88      Resp Readings from Last 3 Encounters:   06/21/19 14   08/09/16 16   08/15/11 12    SpO2 Readings from Last 3 Encounters:   09/19/17 97%   08/21/17 100%   02/08/17 99%      Temp Readings from Last 1 Encounters:   06/21/19 36.5  C (97.7  F) (Tympanic)    Ht Readings from Last 1 Encounters:   06/21/19 1.568 m (5' 1.75\")      Wt Readings from Last 1 Encounters:   06/21/19 77.1 kg (170 lb)    Estimated body mass index is 31.35 kg/m  as calculated from the following:    Height as of 6/21/19: 1.568 m (5' 1.75\").    Weight as of 6/21/19: 77.1 kg (170 lb).       Anesthesia Plan      History & Physical Review  History and physical reviewed and following examination; no interval change.    ASA Status:  2 .    NPO Status:  > 6 hours    Plan for General and MAC with Propofol induction. Maintenance will be TIVA.  Reason for MAC:  Deep or markedly invasive procedure (G8)  PONV prophylaxis:  Ondansetron (or other 5HT-3) and Dexamethasone or Solumedrol       Postoperative Care  Postoperative pain management:  Multi-modal analgesia.      Consents  Anesthetic plan, risks, benefits and alternatives discussed with:  Patient..                 GUDELIA Vincent CRNA  "

## 2019-07-26 ENCOUNTER — HOSPITAL ENCOUNTER (OUTPATIENT)
Facility: CLINIC | Age: 49
Discharge: HOME | End: 2019-07-26
Attending: SURGERY | Admitting: SURGERY
Payer: COMMERCIAL

## 2019-07-26 ENCOUNTER — ANESTHESIA (OUTPATIENT)
Dept: GASTROENTEROLOGY | Facility: CLINIC | Age: 49
End: 2019-07-26
Payer: COMMERCIAL

## 2019-07-26 VITALS
OXYGEN SATURATION: 99 % | DIASTOLIC BLOOD PRESSURE: 81 MMHG | RESPIRATION RATE: 16 BRPM | BODY MASS INDEX: 30.36 KG/M2 | HEART RATE: 68 BPM | HEIGHT: 62 IN | SYSTOLIC BLOOD PRESSURE: 145 MMHG | WEIGHT: 165 LBS | TEMPERATURE: 98.3 F

## 2019-07-26 LAB — COLONOSCOPY: NORMAL

## 2019-07-26 PROCEDURE — G0121 COLON CA SCRN NOT HI RSK IND: HCPCS | Performed by: SURGERY

## 2019-07-26 PROCEDURE — 25800030 ZZH RX IP 258 OP 636: Performed by: SURGERY

## 2019-07-26 PROCEDURE — 25000125 ZZHC RX 250: Performed by: SURGERY

## 2019-07-26 PROCEDURE — 45378 DIAGNOSTIC COLONOSCOPY: CPT | Performed by: SURGERY

## 2019-07-26 PROCEDURE — 25000128 H RX IP 250 OP 636: Performed by: NURSE ANESTHETIST, CERTIFIED REGISTERED

## 2019-07-26 PROCEDURE — 25000128 H RX IP 250 OP 636: Performed by: SURGERY

## 2019-07-26 PROCEDURE — 37000008 ZZH ANESTHESIA TECHNICAL FEE, 1ST 30 MIN: Performed by: SURGERY

## 2019-07-26 PROCEDURE — 25000125 ZZHC RX 250: Performed by: NURSE ANESTHETIST, CERTIFIED REGISTERED

## 2019-07-26 RX ORDER — ONDANSETRON 2 MG/ML
4 INJECTION INTRAMUSCULAR; INTRAVENOUS ONCE
Status: DISCONTINUED | OUTPATIENT
Start: 2019-07-26 | End: 2019-07-26 | Stop reason: HOSPADM

## 2019-07-26 RX ORDER — NALOXONE HYDROCHLORIDE 0.4 MG/ML
.1-.4 INJECTION, SOLUTION INTRAMUSCULAR; INTRAVENOUS; SUBCUTANEOUS
Status: DISCONTINUED | OUTPATIENT
Start: 2019-07-26 | End: 2019-07-26 | Stop reason: HOSPADM

## 2019-07-26 RX ORDER — LIDOCAINE 40 MG/G
CREAM TOPICAL
Status: DISCONTINUED | OUTPATIENT
Start: 2019-07-26 | End: 2019-07-26 | Stop reason: HOSPADM

## 2019-07-26 RX ORDER — GLYCOPYRROLATE 0.2 MG/ML
INJECTION, SOLUTION INTRAMUSCULAR; INTRAVENOUS PRN
Status: DISCONTINUED | OUTPATIENT
Start: 2019-07-26 | End: 2019-07-26

## 2019-07-26 RX ORDER — FLUMAZENIL 0.1 MG/ML
0.2 INJECTION, SOLUTION INTRAVENOUS
Status: DISCONTINUED | OUTPATIENT
Start: 2019-07-26 | End: 2019-07-26 | Stop reason: HOSPADM

## 2019-07-26 RX ORDER — PROPOFOL 10 MG/ML
INJECTION, EMULSION INTRAVENOUS CONTINUOUS PRN
Status: DISCONTINUED | OUTPATIENT
Start: 2019-07-26 | End: 2019-07-26

## 2019-07-26 RX ORDER — SODIUM CHLORIDE, SODIUM LACTATE, POTASSIUM CHLORIDE, CALCIUM CHLORIDE 600; 310; 30; 20 MG/100ML; MG/100ML; MG/100ML; MG/100ML
INJECTION, SOLUTION INTRAVENOUS CONTINUOUS
Status: DISCONTINUED | OUTPATIENT
Start: 2019-07-26 | End: 2019-07-26 | Stop reason: HOSPADM

## 2019-07-26 RX ORDER — PROPOFOL 10 MG/ML
INJECTION, EMULSION INTRAVENOUS PRN
Status: DISCONTINUED | OUTPATIENT
Start: 2019-07-26 | End: 2019-07-26

## 2019-07-26 RX ORDER — ONDANSETRON 2 MG/ML
4 INJECTION INTRAMUSCULAR; INTRAVENOUS
Status: COMPLETED | OUTPATIENT
Start: 2019-07-26 | End: 2019-07-26

## 2019-07-26 RX ADMIN — PROPOFOL 200 MCG/KG/MIN: 10 INJECTION, EMULSION INTRAVENOUS at 09:01

## 2019-07-26 RX ADMIN — SODIUM CHLORIDE, POTASSIUM CHLORIDE, SODIUM LACTATE AND CALCIUM CHLORIDE: 600; 310; 30; 20 INJECTION, SOLUTION INTRAVENOUS at 08:23

## 2019-07-26 RX ADMIN — ONDANSETRON 4 MG: 2 INJECTION INTRAMUSCULAR; INTRAVENOUS at 08:22

## 2019-07-26 RX ADMIN — PROPOFOL 100 MG: 10 INJECTION, EMULSION INTRAVENOUS at 09:01

## 2019-07-26 RX ADMIN — LIDOCAINE HYDROCHLORIDE 1 ML: 10 INJECTION, SOLUTION EPIDURAL; INFILTRATION; INTRACAUDAL; PERINEURAL at 08:22

## 2019-07-26 RX ADMIN — GLYCOPYRROLATE 0.2 MG: 0.2 INJECTION, SOLUTION INTRAMUSCULAR; INTRAVENOUS at 09:01

## 2019-07-26 ASSESSMENT — MIFFLIN-ST. JEOR: SCORE: 1316.69

## 2019-07-26 NOTE — H&P
"49 year old year old female here for colonoscopy for screening. This is patient's first colonoscopy.  Patient denies blood in stool or change in stool caliber.  No known family history of colon cancer or polyps.    Patient Active Problem List   Diagnosis     Hemorrhoids     Decreased libido     Pain in joint, multiple sites     Inflammatory polyarthropathy (H)     Hematochezia     CARDIOVASCULAR SCREENING; LDL GOAL LESS THAN 160     Dyspareunia     Vulvar pain       Past Medical History:   Diagnosis Date     Basal cell carcinoma      Cancer (H)     Lip- basal cell carcinoma     Dyspareunia      Frequent UTI     Reports with increased intercourse     heavy menses     controlled with ablation       Past Surgical History:   Procedure Laterality Date     C ORAL SURGERY PROCEDURE  1988    [place] teeth     C/SECTION, LOW TRANSVERSE  11/99, 10/00     DILATE CERVIX, HYSTEROSCOPY, ABLATE ENDOMETRIUM NOVASURE, COMBINED  2010     TONSILLECTOMY & ADENOIDECTOMY  1976       Family History   Problem Relation Age of Onset     Cancer Father         squamous cell, skin     Prostate Cancer Father 68     Diabetes Father      C.A.D. Father      Diabetes Paternal Grandmother      Diabetes Sister         gestational diabetes     Respiratory Sister         asthma     Hypertension Mother      Cancer Mother         basal cell     Osteoporosis Mother        No current outpatient medications on file.       Allergies   Allergen Reactions     Nka [No Known Allergies]        Pt reports that she has never smoked. She has never used smokeless tobacco. She reports that she drinks alcohol. She reports that she does not use drugs.    Exam:  /89 (BP Location: Right arm)   Pulse 79   Temp 98.3  F (36.8  C) (Oral)   Resp 18   Ht 1.575 m (5' 2\")   Wt 74.8 kg (165 lb)   SpO2 99%   BMI 30.18 kg/m      Awake, Alert OX3  Lungs - CTA bilaterally  CV - RRR, no murmurs, distal pulses intact  Abd - soft, non-distended, non-tender, +BS  Extr - No " cyanosis or edema    A/P 49 year old year old female in need of colonoscopy for screening. Risks, benefits, alternatives, and complications were discussed including the possibility of perforation, bleeding, missed lesion and the patient agreed to proceed    Madhu Sosa DO on 7/26/2019 at 8:24 AM

## 2019-07-26 NOTE — ANESTHESIA CARE TRANSFER NOTE
Patient: Aixa Coker    Procedure(s):  COLONOSCOPY    Diagnosis: screening  Diagnosis Additional Information: No value filed.    Anesthesia Type:   General, MAC     Note:  Airway :Room Air  Patient transferred to:Phase II  Handoff Report: Identifed the Patient, Identified the Reponsible Provider, Reviewed the pertinent medical history, Discussed the surgical course, Reviewed Intra-OP anesthesia mangement and issues during anesthesia, Set expectations for post-procedure period and Allowed opportunity for questions and acknowledgement of understanding      Vitals: (Last set prior to Anesthesia Care Transfer)    CRNA VITALS  7/26/2019 0855 - 7/26/2019 0927      7/26/2019             Pulse:  74    Ht Rate:  73    SpO2:  99 %                Electronically Signed By: Ashok Phan CRNA, APRN CRNA  July 26, 2019  9:27 AM

## 2019-07-26 NOTE — ANESTHESIA POSTPROCEDURE EVALUATION
Patient: Aixa Coker    Procedure(s):  COLONOSCOPY    Diagnosis:screening  Diagnosis Additional Information: No value filed.    Anesthesia Type:  General, MAC    Note:  Anesthesia Post Evaluation    Patient location during evaluation: Phase 2 and Bedside  Patient participation: Able to fully participate in evaluation  Level of consciousness: awake  Pain management: adequate  Airway patency: patent  Cardiovascular status: acceptable  Respiratory status: acceptable  Hydration status: acceptable  PONV: none     Anesthetic complications: None          Last vitals:  Vitals:    07/26/19 0806   BP: 128/89   Pulse: 79   Resp: 18   Temp: 36.8  C (98.3  F)   SpO2: 99%         Electronically Signed By: Ashok Phan CRNA, GUDELIA EPPS  July 26, 2019  9:27 AM

## 2019-08-02 ENCOUNTER — OFFICE VISIT (OUTPATIENT)
Dept: OBGYN | Facility: CLINIC | Age: 49
End: 2019-08-02
Payer: COMMERCIAL

## 2019-08-02 VITALS
RESPIRATION RATE: 18 BRPM | HEIGHT: 62 IN | WEIGHT: 170 LBS | DIASTOLIC BLOOD PRESSURE: 80 MMHG | SYSTOLIC BLOOD PRESSURE: 125 MMHG | HEART RATE: 98 BPM | BODY MASS INDEX: 31.28 KG/M2 | TEMPERATURE: 96.8 F

## 2019-08-02 DIAGNOSIS — N90.89 LABIAL AND CLITORAL ADHESIONS, ACQUIRED: ICD-10-CM

## 2019-08-02 DIAGNOSIS — L90.0 LICHEN SCLEROSUS: Primary | ICD-10-CM

## 2019-08-02 PROCEDURE — 99203 OFFICE O/P NEW LOW 30 MIN: CPT | Performed by: OBSTETRICS & GYNECOLOGY

## 2019-08-02 ASSESSMENT — MIFFLIN-ST. JEOR: SCORE: 1335.39

## 2019-08-02 NOTE — PROGRESS NOTES
"Aixa is a 49 year old   female who presents for advice regarding potential issues with introitus/vagina; she states a long history of \"problems with my vagina\", h/o vaginitis, vulvar issues (biopsy just showed bland inflammation and not LSA); she was given RX for Premarin vag cream but never took it.  She and her  are not generally sexually active but that could change if problems addressed  She has h/o ablation in , amenorrheic since then  She has h/o c/section x 2, no vaginal deliveries or surgery in the past..  She is referred for consult by Gracia Luu    Patient Active Problem List    Diagnosis Date Noted     Labial and clitoral adhesions, acquired 2019     Priority: Medium     Lichen sclerosus 2019     Priority: Medium     Vulvar pain 2014     Priority: Medium     Chronic inflammation  Clobetasol ointment       Dyspareunia 2013     Priority: Medium     CARDIOVASCULAR SCREENING; LDL GOAL LESS THAN 160 10/31/2010     Priority: Medium     Hematochezia 2009     Priority: Medium     Inflammatory polyarthropathy (H) 2009     Priority: Medium     Dr. Lujan @ [facility] Consults  Dz is stable, Tolerating her meds. Monitoring labs/meds   Problem list name updated by automated process. Provider to review       Pain in joint, multiple sites 2007     Priority: Medium     10/15/07Dr. [email or web address] Consult mild evidence of synovitis in the 2nd,3rd,4th MCP joints. Placed on prednisone labs drawn for RA.Pt marquez fu in 3 weeks  07 Fu on muscles aches. Labs WNL,ordered DEXA. If normal will con't NSAIDs,stopped relafen for now. Celebrex samples given  4/10/08 [facility] FU pt doing well on current meds FU in 4mos.       Hemorrhoids 2007     Priority: Medium     Problem list name updated by automated process. Provider to review       Decreased libido 2007     Priority: Medium       All systems were reviewed and pertinent " information in noted in subjective/HPI.    Past Medical History:   Diagnosis Date     Basal cell carcinoma      Cancer (H)     Lip- basal cell carcinoma     Dyspareunia      Frequent UTI     Reports with increased intercourse     heavy menses     controlled with ablation       Past Surgical History:   Procedure Laterality Date     C ORAL SURGERY PROCEDURE  1988    [place] teeth     C/SECTION, LOW TRANSVERSE  11/99, 10/00     COLONOSCOPY N/A 7/26/2019    Procedure: COLONOSCOPY;  Surgeon: Madhu Sosa DO;  Location: [facility]     DILATE CERVIX, HYSTEROSCOPY, ABLATE ENDOMETRIUM NOVASURE, COMBINED  2010     TONSILLECTOMY & ADENOIDECTOMY  1976         Current Outpatient Medications:      MAGNESIUM PO, Take 2 tablets by mouth daily, Disp: , Rfl:      conjugated estrogens (PREMARIN) cream, Place 0.5 g vaginally twice a week (Patient not taking: Reported on 8/2/2019), Disp: 30 g, Rfl: 12    ALLERGIES:  Nka [no known allergies]    Social History     Socioeconomic History     Marital status:      Spouse name: None     Number of children: 2     Years of education: None     Highest education level: None   Occupational History     Employer: [employer]     Comment: middle school, techinical support   Social Needs     Financial resource strain: None     Food insecurity:     Worry: None     Inability: None     Transportation needs:     Medical: None     Non-medical: None   Tobacco Use     Smoking status: Never Smoker     Smokeless tobacco: Never Used   Substance and Sexual Activity     Alcohol use: Yes     Comment: Avg. 1-2 drinks per week     Drug use: No     Sexual activity: Yes     Partners: Male     Birth control/protection: Condom   Lifestyle     Physical activity:     Days per week: None     Minutes per session: None     Stress: None   Relationships     Social connections:     Talks on phone: None     Gets together: None     Attends Druze service: None     Active member of club or organization:  "None     Attends meetings of clubs or organizations: None     Relationship status: None     Intimate partner violence:     Fear of current or ex partner: None     Emotionally abused: None     Physically abused: None     Forced sexual activity: None   Other Topics Concern     Parent/sibling w/ CABG, MI or angioplasty before 65F 55M? No   Social History Narrative     None       Family History   Problem Relation Age of Onset     Cancer Father         squamous cell, skin     Prostate Cancer Father 68     Diabetes Father      C.A.D. Father      Diabetes Paternal Grandmother      Diabetes Sister         gestational diabetes     Respiratory Sister         asthma     Hypertension Mother      Cancer Mother         basal cell     Osteoporosis Mother        OBJECTIVE:  Vitals: /80 (BP Location: Right arm, Patient Position: Chair, Cuff Size: Adult Small)   Pulse 98   Temp 96.8  F (36  C) (Tympanic)   Resp 18   Ht 1.568 m (5' 1.75\")   Wt 77.1 kg (170 lb)   BMI 31.35 kg/m   BMI= Body mass index is 31.35 kg/m .   No LMP recorded. Patient has had an ablation.     GENERAL APPEARANCE: healthy, alert and no distress  PELVIC:  EGBUS: the labia minora are fused in the midline with obliteration of the clitoral galvez; there is chronic inflammation changes along the perineal body and around the anus as well  The introitus is able to admit a Melvina speculum but unable to open up to visualize cervix due to stenosis of introitus  Able to admit full single digit to apex with normal length documented.    ASSESSMENT:      ICD-10-CM    1. Lichen sclerosus L90.0    2. Labial and clitoral adhesions, acquired N90.89        PLAN:  I suspect given the chronicity of the symptoms and evidence of phimosis, that this is a case of LIchen Sclerosus.  Since there is no real active inflammation at this time, I recommend the use of serial vaginal/introital dilators along with some topical premarin cream at introitus to help facilitate making the " introitus more ample  Would then return to clinic for nieves exam and Pap smear.  If inflammatory symptoms occur, then would tx with Clobetasol ointment or Betamethasone ointment (high potency) to address the symptoms as this condition can be resistant to low potency formulations    She was given a set of pediatric vaginal dilators for home use.    Raffi Biggs MD  [facility]    Duration of visit:  30 minutes, >50% in discussion of current issues, treatment options and treatment planning.  RAFFI BIGGS MD    C: Gracia Biggs MD

## 2019-08-22 ENCOUNTER — OFFICE VISIT (OUTPATIENT)
Dept: DERMATOLOGY | Facility: CLINIC | Age: 49
End: 2019-08-22
Payer: COMMERCIAL

## 2019-08-22 VITALS — SYSTOLIC BLOOD PRESSURE: 151 MMHG | DIASTOLIC BLOOD PRESSURE: 84 MMHG | HEART RATE: 74 BPM | OXYGEN SATURATION: 98 %

## 2019-08-22 DIAGNOSIS — L82.0 INFLAMED SEBORRHEIC KERATOSIS: Primary | ICD-10-CM

## 2019-08-22 DIAGNOSIS — D22.9 NEVUS: ICD-10-CM

## 2019-08-22 DIAGNOSIS — D18.01 ANGIOMA OF SKIN: ICD-10-CM

## 2019-08-22 DIAGNOSIS — L82.1 SEBORRHEIC KERATOSIS: ICD-10-CM

## 2019-08-22 DIAGNOSIS — L81.4 LENTIGO: ICD-10-CM

## 2019-08-22 DIAGNOSIS — L21.9 DERMATITIS, SEBORRHEIC: ICD-10-CM

## 2019-08-22 DIAGNOSIS — Z85.828 HISTORY OF BASAL CELL CARCINOMA (BCC) OF SKIN: ICD-10-CM

## 2019-08-22 PROCEDURE — 17110 DESTRUCTION B9 LES UP TO 14: CPT | Performed by: PHYSICIAN ASSISTANT

## 2019-08-22 PROCEDURE — 99213 OFFICE O/P EST LOW 20 MIN: CPT | Mod: 25 | Performed by: PHYSICIAN ASSISTANT

## 2019-08-22 NOTE — LETTER
8/22/2019         RE: Aixa Coker  [address]  [place] [address]        Dear Colleague,    Thank you for referring your patient, Aixa Coker, to the [facility]. Please see a copy of my visit note below.    HPI:   Chief complaints: Aixa Coker is a 49 year old female who presents for Full skin cancer screening to rule out skin cancer   Last Skin Exam: 2 years ago      1st Baseline: no  Personal HX of Skin Cancer: Yes BCC x 2   Personal HX of Malignant Melanoma: no   Family HX of Skin Cancer / Malignant Melanoma: no  Personal HX of Atypical Moles:   no  Risk factors: history of frequent sun exposure ;bcc x 2  New / Changing lesions: Yes has a few itchy areas - one on the arm; also getting new spots beneath the breasts.   Social History:   On review of systems, there are no further skin complaints, patient is feeling otherwise well.  See patient intake sheet.  ROS of the following were done and are negative: Constitutional, Eyes, Ears, Nose,   Mouth, Throat, Cardiovascular, Respiratory, GI, Genitourinary, Musculoskeletal,   Psychiatric, Endocrine, Allergic/Immunologic.    PHYSICAL EXAM:   BP (!) 151/84   Pulse 74   SpO2 98%   Skin exam performed as follows: Type 2 skin. Mood appropriate  Alert and Oriented X 3. Well developed, well nourished in no distress.  General appearance: Normal  Head including face: Normal  Eyes: conjunctiva and lids: Normal  Mouth: Lips, teeth, gums: Normal  Neck: Normal  Chest-breast/axillae: Normal  Back: Normal  Spleen and liver: Normal  Cardiovascular: Exam of peripheral vascular system by observation for swelling, varicosities, edema: Normal  Genitalia: groin, buttocks: Normal  Extremities: digits/nails (clubbing): Normal  Eccrine and Apocrine glands: Normal  Right upper extremity: Normal  Left upper extremity: Normal  Right lower extremity: Normal  Left lower extremity: Normal  Skin: Scalp and body hair: See below    Pt deferred  exam of breasts, groin, buttocks: No    Other physical findings:  1. Multiple pigmented macules on extremities and trunk  2. Multiple pigmented macules on face, trunk and extremities  3. Multiple vascular papules on trunk, arms and legs  4. Multiple scattered keratotic plaques  5. Inflamed keratotic papule on the right forearm x 1, left upper thigh x 1       Except as noted above, no other signs of skin cancer or melanoma.     ASSESSMENT/PLAN:   Benign Full skin cancer screening today. . Patient with history of BCC x 2  Advised on monthly self exams and 1 year  Patient Education: Appropriate brochures given.    1. Multiple benign appearing nevi on arms, legs and trunk. Discussed ABCDEs of melanoma and sunscreen.   2. Multiple lentigos on arms, legs and trunk. Advised benign, no treatment needed.  3. Multiple scattered angiomas. Advised benign, no treatment needed.   4. Seborrheic keratosis on arms, legs and trunk. Advised benign, no treatment needed.  5. Inflamed seborrheic keratosis on the right forearm x 1, left upper thigh x 1. As physically tender cryosurgery performed. Advised on post op care.   6. Mild seborrheic dermatitis on the left ear canal - start OTC HC cream; can call for stronger rx if struggling .   7. Tita to follow up with Primary Care provider regarding elevated blood pressure.            Follow-up: yearly FSE/PRN sooner    1.) Patient was asked about new and changing moles. YES  2.) Patient received a complete physical skin examination: YES  3.) Patient was counseled to perform a monthly self skin examination: YES  Scribed By: Kaye Richardson, MS, PASHARON        Again, thank you for allowing me to participate in the care of your patient.        Sincerely,        Kaye Richardson PA-C

## 2019-08-22 NOTE — NURSING NOTE
Chief Complaint   Patient presents with     Skin Check       Vitals:    08/22/19 1637   BP: (!) 151/84   Pulse: 74   SpO2: 98%     Wt Readings from Last 1 Encounters:   08/02/19 77.1 kg (170 lb)       Zuly Lees LPN.................8/22/2019

## 2019-08-22 NOTE — PROGRESS NOTES
HPI:   Chief complaints: Aixa Coker is a 49 year old female who presents for Full skin cancer screening to rule out skin cancer   Last Skin Exam: 2 years ago      1st Baseline: no  Personal HX of Skin Cancer: Yes BCC x 2   Personal HX of Malignant Melanoma: no   Family HX of Skin Cancer / Malignant Melanoma: no  Personal HX of Atypical Moles:   no  Risk factors: history of frequent sun exposure ;bcc x 2  New / Changing lesions: Yes has a few itchy areas - one on the arm; also getting new spots beneath the breasts.   Social History:   On review of systems, there are no further skin complaints, patient is feeling otherwise well.  See patient intake sheet.  ROS of the following were done and are negative: Constitutional, Eyes, Ears, Nose,   Mouth, Throat, Cardiovascular, Respiratory, GI, Genitourinary, Musculoskeletal,   Psychiatric, Endocrine, Allergic/Immunologic.    PHYSICAL EXAM:   BP (!) 151/84   Pulse 74   SpO2 98%   Skin exam performed as follows: Type 2 skin. Mood appropriate  Alert and Oriented X 3. Well developed, well nourished in no distress.  General appearance: Normal  Head including face: Normal  Eyes: conjunctiva and lids: Normal  Mouth: Lips, teeth, gums: Normal  Neck: Normal  Chest-breast/axillae: Normal  Back: Normal  Spleen and liver: Normal  Cardiovascular: Exam of peripheral vascular system by observation for swelling, varicosities, edema: Normal  Genitalia: groin, buttocks: Normal  Extremities: digits/nails (clubbing): Normal  Eccrine and Apocrine glands: Normal  Right upper extremity: Normal  Left upper extremity: Normal  Right lower extremity: Normal  Left lower extremity: Normal  Skin: Scalp and body hair: See below    Pt deferred exam of breasts, groin, buttocks: No    Other physical findings:  1. Multiple pigmented macules on extremities and trunk  2. Multiple pigmented macules on face, trunk and extremities  3. Multiple vascular papules on trunk, arms and legs  4. Multiple  scattered keratotic plaques  5. Inflamed keratotic papule on the right forearm x 1, left upper thigh x 1       Except as noted above, no other signs of skin cancer or melanoma.     ASSESSMENT/PLAN:   Benign Full skin cancer screening today. . Patient with history of BCC x 2  Advised on monthly self exams and 1 year  Patient Education: Appropriate brochures given.    1. Multiple benign appearing nevi on arms, legs and trunk. Discussed ABCDEs of melanoma and sunscreen.   2. Multiple lentigos on arms, legs and trunk. Advised benign, no treatment needed.  3. Multiple scattered angiomas. Advised benign, no treatment needed.   4. Seborrheic keratosis on arms, legs and trunk. Advised benign, no treatment needed.  5. Inflamed seborrheic keratosis on the right forearm x 1, left upper thigh x 1. As physically tender cryosurgery performed. Advised on post op care.   6. Mild seborrheic dermatitis on the left ear canal - start OTC HC cream; can call for stronger rx if struggling .   7. Tita to follow up with Primary Care provider regarding elevated blood pressure.            Follow-up: yearly FSE/PRN sooner    1.) Patient was asked about new and changing moles. YES  2.) Patient received a complete physical skin examination: YES  3.) Patient was counseled to perform a monthly self skin examination: YES  Scribed By: Kaye Richardson MS, PAHuberC

## 2019-11-03 ENCOUNTER — HEALTH MAINTENANCE LETTER (OUTPATIENT)
Age: 49
End: 2019-11-03

## 2019-12-03 ENCOUNTER — HOSPITAL ENCOUNTER (OUTPATIENT)
Dept: MAMMOGRAPHY | Facility: CLINIC | Age: 49
Discharge: HOME | End: 2019-12-03
Attending: PHYSICIAN ASSISTANT | Admitting: PHYSICIAN ASSISTANT
Payer: COMMERCIAL

## 2019-12-03 DIAGNOSIS — Z12.31 VISIT FOR SCREENING MAMMOGRAM: ICD-10-CM

## 2019-12-03 PROCEDURE — 77067 SCR MAMMO BI INCL CAD: CPT

## 2019-12-27 ENCOUNTER — TELEPHONE (OUTPATIENT)
Dept: OBGYN | Facility: CLINIC | Age: 49
End: 2019-12-27

## 2019-12-27 NOTE — LETTER
December 27, 2019      Aixa Coker  [address] [address]    Dear MsRajeev,      This letter is to remind you that you are due for your Annual Exam.    Please call [phone] to schedule your appointment at your earliest convenience.     If you have completed the tests outside of [place], please have the results forwarded to our office. We will update the chart for your primary Physician to review before your next annual physical.     Sincerely,      Your [place] Care Team

## 2019-12-27 NOTE — TELEPHONE ENCOUNTER
Panel Management Review    Date of last visit with a [place] provider:  on 8/2/19.  Date of next visit with a [place] provider: None.    Health Maintenance List    Health Maintenance   Topic Date Due     HIV SCREENING  02/20/1983     ZOSTER IMMUNIZATION (1 of 2) 02/20/2018     HPV  08/15/2018     PAP  08/15/2018     PREVENTIVE CARE VISIT  06/21/2020     MAMMO SCREENING  12/03/2021     LIPID  08/17/2022     DTAP/TDAP/TD IMMUNIZATION (4 - Td) 06/21/2029     COLONOSCOPY  07/26/2029     PHQ-2  Completed     INFLUENZA VACCINE  Completed     IPV IMMUNIZATION  Aged Out     MENINGITIS IMMUNIZATION  Aged Out       Composite cancer screening  Chart review shows that this patient is due/due soon for the following Pap Smear  Lab Results   Component Value Date    PAP NIL 08/15/2013     Past Surgical History:   Procedure Laterality Date     C ORAL SURGERY PROCEDURE  1988    [place] teeth     C/SECTION, LOW TRANSVERSE  11/99, 10/00     COLONOSCOPY N/A 7/26/2019    Procedure: COLONOSCOPY;  Surgeon: Madhu Sosa, ;  Location: [facility]     DILATE CERVIX, HYSTEROSCOPY, ABLATE ENDOMETRIUM NOVASURE, COMBINED  2010     TONSILLECTOMY & ADENOIDECTOMY  1976       Is hysterectomy listed in surgical history? No   Is mastectomy listed in surgical history? No     Summary:    Patient is due/failing the following:   Pap Smear    Action needed: Patient needs office visit for annual.    Type of outreach:  Sent letter.      Staff Signature:  Loly Hart CMA

## 2020-11-16 ENCOUNTER — HEALTH MAINTENANCE LETTER (OUTPATIENT)
Age: 50
End: 2020-11-16

## 2021-02-07 ENCOUNTER — HEALTH MAINTENANCE LETTER (OUTPATIENT)
Age: 51
End: 2021-02-07

## 2021-04-23 ENCOUNTER — HOSPITAL ENCOUNTER (OUTPATIENT)
Dept: MAMMOGRAPHY | Facility: CLINIC | Age: 51
Discharge: HOME | End: 2021-04-23
Admitting: RADIOLOGY
Payer: COMMERCIAL

## 2021-04-23 DIAGNOSIS — Z12.31 VISIT FOR SCREENING MAMMOGRAM: ICD-10-CM

## 2021-04-23 PROCEDURE — 77063 BREAST TOMOSYNTHESIS BI: CPT

## 2021-09-18 ENCOUNTER — HEALTH MAINTENANCE LETTER (OUTPATIENT)
Age: 51
End: 2021-09-18

## 2022-01-08 ENCOUNTER — HEALTH MAINTENANCE LETTER (OUTPATIENT)
Age: 52
End: 2022-01-08

## 2022-08-04 ENCOUNTER — HOSPITAL ENCOUNTER (OUTPATIENT)
Dept: MAMMOGRAPHY | Facility: CLINIC | Age: 52
Discharge: HOME | End: 2022-08-04
Attending: PHYSICIAN ASSISTANT | Admitting: PHYSICIAN ASSISTANT
Payer: COMMERCIAL

## 2022-08-04 DIAGNOSIS — Z12.31 VISIT FOR SCREENING MAMMOGRAM: ICD-10-CM

## 2022-08-04 PROCEDURE — 77067 SCR MAMMO BI INCL CAD: CPT

## 2022-10-20 ENCOUNTER — OFFICE VISIT (OUTPATIENT)
Dept: DERMATOLOGY | Facility: CLINIC | Age: 52
End: 2022-10-20
Payer: COMMERCIAL

## 2022-10-20 DIAGNOSIS — L82.1 SEBORRHEIC KERATOSIS: ICD-10-CM

## 2022-10-20 DIAGNOSIS — D18.01 ANGIOMA OF SKIN: ICD-10-CM

## 2022-10-20 DIAGNOSIS — D48.5 NEOPLASM OF UNCERTAIN BEHAVIOR OF SKIN: ICD-10-CM

## 2022-10-20 DIAGNOSIS — D22.9 NEVUS: ICD-10-CM

## 2022-10-20 DIAGNOSIS — L82.0 INFLAMED SEBORRHEIC KERATOSIS: ICD-10-CM

## 2022-10-20 DIAGNOSIS — L81.4 LENTIGO: ICD-10-CM

## 2022-10-20 DIAGNOSIS — L30.9 DERMATITIS: Primary | ICD-10-CM

## 2022-10-20 PROCEDURE — 99204 OFFICE O/P NEW MOD 45 MIN: CPT | Mod: 25 | Performed by: PHYSICIAN ASSISTANT

## 2022-10-20 PROCEDURE — 88305 TISSUE EXAM BY PATHOLOGIST: CPT | Performed by: DERMATOLOGY

## 2022-10-20 PROCEDURE — 17110 DESTRUCTION B9 LES UP TO 14: CPT | Performed by: PHYSICIAN ASSISTANT

## 2022-10-20 PROCEDURE — 11102 TANGNTL BX SKIN SINGLE LES: CPT | Mod: 59 | Performed by: PHYSICIAN ASSISTANT

## 2022-10-20 RX ORDER — LACTOBACILLUS RHAMNOSUS GG 10B CELL
1 CAPSULE ORAL 2 TIMES DAILY
COMMUNITY
End: 2024-05-08

## 2022-10-20 RX ORDER — BETAMETHASONE DIPROPIONATE 0.5 MG/G
CREAM TOPICAL
Qty: 15 G | Refills: 2 | Status: SHIPPED | OUTPATIENT
Start: 2022-10-20 | End: 2023-10-26

## 2022-10-20 ASSESSMENT — PAIN SCALES - GENERAL: PAINLEVEL: NO PAIN (0)

## 2022-10-20 NOTE — LETTER
10/20/2022         RE: Aixa Coker  [address] [address]        Dear Colleague,    Thank you for referring your patient, Aixa Coker, to the [facility]. Please see a copy of my visit note below.    HPI:   Chief complaints: Aixa Coker is a 52 year old female who presents for Full skin cancer screening to rule out skin cancer   Last Skin Exam: 3 years ago      1st Baseline: no  Personal HX of Skin Cancer: Yes BCC x 2   Personal HX of Malignant Melanoma: no   Family HX of Skin Cancer / Malignant Melanoma: no  Personal HX of Atypical Moles:   no  Risk factors: history of frequent sun exposure ;bcc x 2  New / Changing lesions: Yes has a few itchy areas  On review of systems, there are no further skin complaints, patient is feeling otherwise well.  See patient intake sheet.  ROS of the following were done and are negative: Constitutional, Eyes, Ears, Nose,   Mouth, Throat, Cardiovascular, Respiratory, GI, Genitourinary, Musculoskeletal,   Psychiatric, Endocrine, Allergic/Immunologic.    PHYSICAL EXAM:   There were no vitals taken for this visit.  Skin exam performed as follows: Type 2 skin. Mood appropriate  Alert and Oriented X 3. Well developed, well nourished in no distress.  General appearance: Normal  Head including face: Normal  Eyes: conjunctiva and lids: Normal  Mouth: Lips, teeth, gums: Normal  Neck: Normal  Chest-breast/axillae: Normal  Back: Normal  Spleen and liver: Normal  Cardiovascular: Exam of peripheral vascular system by observation for swelling, varicosities, edema: Normal  Genitalia: groin, buttocks: Normal  Extremities: digits/nails (clubbing): Normal  Eccrine and Apocrine glands: Normal  Right upper extremity: Normal  Left upper extremity: Normal  Right lower extremity: Normal  Left lower extremity: Normal  Skin: Scalp and body hair: See below    Pt deferred exam of breasts, groin, buttocks: No    Other physical findings:  1.  Multiple pigmented macules on extremities and trunk  2. Multiple pigmented macules on face, trunk and extremities  3. Multiple vascular papules on trunk, arms and legs  4. Multiple scattered keratotic plaques  5. Inflamed keratotic papule on the left posterior shoulder x 1, left flank x 1, right hip x 1, right mid back x 1  6. 3 mm pink macule on the left upper arm       Except as noted above, no other signs of skin cancer or melanoma.     ASSESSMENT/PLAN:   Benign Full skin cancer screening today. . Patient with history of BCC x 2  Advised on monthly self exams and 1 year  Patient Education: Appropriate brochures given.    1. Multiple benign appearing nevi on arms, legs and trunk. Discussed ABCDEs of melanoma and sunscreen.   2. Multiple lentigos on arms, legs and trunk. Advised benign, no treatment needed.  3. Multiple scattered angiomas. Advised benign, no treatment needed.   4. Seborrheic keratosis on arms, legs and trunk. Advised benign, no treatment needed.  5. Inflamed seborrheic keratosis on the left posterior shoulder x 1, left flank x 1, right hip x 1, right mid back x 1. As physically tender cryosurgery performed. Advised on post op care.   6. R/o BCC on the left upper arm Shave biopsy performed.  Area cleaned and anesthetized with 1% lidocaine with epinephrine.  Dermablade used to remove the lesion and sent to pathology. Bleeding was cauterized. Pt tolerated procedure well with no complications.   7. Mild seb derm in the left ear - start betamethasone cream BID x 1-2 weeks then PRN only            Follow-up: yearly FSE/PRN sooner    1.) Patient was asked about new and changing moles. YES  2.) Patient received a complete physical skin examination: YES  3.) Patient was counseled to perform a monthly self skin examination: YES  Scribed By: Kaye Richardson, MS, PASHARON          Again, thank you for allowing me to participate in the care of your patient.        Sincerely,        Kaye Richardson PA-C

## 2022-10-20 NOTE — PATIENT INSTRUCTIONS
Wound Care Instructions     FOR SUPERFICIAL WOUNDS     [facility]  [phone]                 AFTER 24 HOURS YOU SHOULD REMOVE THE BANDAGE AND BEGIN DAILY DRESSING CHANGES AS FOLLOWS:     1) Remove Dressing.     2) Clean and dry the area with tap water using a Q-tip or sterile gauze pad.     3) Apply Vaseline, Aquaphor, Polysporin ointment or Bacitracin ointment over entire wound.  Do NOT use Neosporin ointment.     4) Cover the wound with a band-aid, or a sterile non-stick gauze pad and micropore paper tape    REPEAT THESE INSTRUCTIONS AT LEAST ONCE A DAY UNTIL THE WOUND HAS COMPLETELY HEALED.    It is an old wives tale that a wound heals better when it is exposed to air and allowed to dry out. The wound will heal faster with a better cosmetic result if it is kept moist with ointment and covered with a bandage.    **Do not let the wound dry out.**    Supplies Needed:      *Cotton tipped applicators (Q-tips)    *Vaseline, Aquaphor, Polysporin or Bacitracin Ointment (NOT NEOSPORIN)    *Band-aids or non-stick gauze pads and micropore paper tape.      PATIENT INFORMATION:    During the healing process you will notice a number of changes. All wounds develop a small halo of redness surrounding the wound.  This means healing is occurring. Severe itching with extensive redness usually indicates sensitivity to the ointment or bandage tape used to dress the wound.  You should call our office if this develops.      Swelling  and/or discoloration around your surgical site is common, particularly when performed around the eye.    All wounds normally drain.  The larger the wound the more drainage there will be.  After 7-10 days, you will notice the wound beginning to shrink and new skin will begin to grow.  The wound is healed when you can see skin has formed over the entire area.  A healed wound has a healthy, shiny look to the surface and is red to dark pink in color to normalize.  Wounds may  take approximately 4-6 weeks to heal.  Larger wounds may take 6-8 weeks.  After the wound is healed you may discontinue dressing changes.    You may experience a sensation of tightness as your wound heals. This is normal and will gradually subside.    Your healed wound may be sensitive to temperature changes. This sensitivity improves with time, but if you re having a lot of discomfort, try to avoid temperature extremes.    Patients frequently experience itching after their wound appears to have healed because of the continue healing under the skin.  Plain Vaseline will help relieve the itching.      POSSIBLE COMPLICATIONS    BLEEDING:    Leave the bandage in place.  Use tightly rolled up gauze or a cloth to apply direct pressure over the bandage for 30  minutes.  Reapply pressure for an additional 30 minutes if necessary  Use additional gauze and tape to maintain pressure once the bleeding has stopped.

## 2022-10-20 NOTE — PROGRESS NOTES
HPI:   Chief complaints: Aixa Coker is a 52 year old female who presents for Full skin cancer screening to rule out skin cancer   Last Skin Exam: 3 years ago      1st Baseline: no  Personal HX of Skin Cancer: Yes BCC x 2   Personal HX of Malignant Melanoma: no   Family HX of Skin Cancer / Malignant Melanoma: no  Personal HX of Atypical Moles:   no  Risk factors: history of frequent sun exposure ;bcc x 2  New / Changing lesions: Yes has a few itchy areas  On review of systems, there are no further skin complaints, patient is feeling otherwise well.  See patient intake sheet.  ROS of the following were done and are negative: Constitutional, Eyes, Ears, Nose,   Mouth, Throat, Cardiovascular, Respiratory, GI, Genitourinary, Musculoskeletal,   Psychiatric, Endocrine, Allergic/Immunologic.    PHYSICAL EXAM:   There were no vitals taken for this visit.  Skin exam performed as follows: Type 2 skin. Mood appropriate  Alert and Oriented X 3. Well developed, well nourished in no distress.  General appearance: Normal  Head including face: Normal  Eyes: conjunctiva and lids: Normal  Mouth: Lips, teeth, gums: Normal  Neck: Normal  Chest-breast/axillae: Normal  Back: Normal  Spleen and liver: Normal  Cardiovascular: Exam of peripheral vascular system by observation for swelling, varicosities, edema: Normal  Genitalia: groin, buttocks: Normal  Extremities: digits/nails (clubbing): Normal  Eccrine and Apocrine glands: Normal  Right upper extremity: Normal  Left upper extremity: Normal  Right lower extremity: Normal  Left lower extremity: Normal  Skin: Scalp and body hair: See below    Pt deferred exam of breasts, groin, buttocks: No    Other physical findings:  1. Multiple pigmented macules on extremities and trunk  2. Multiple pigmented macules on face, trunk and extremities  3. Multiple vascular papules on trunk, arms and legs  4. Multiple scattered keratotic plaques  5. Inflamed keratotic papule on the left posterior  shoulder x 1, left flank x 1, right hip x 1, right mid back x 1  6. 3 mm pink macule on the left upper arm       Except as noted above, no other signs of skin cancer or melanoma.     ASSESSMENT/PLAN:   Benign Full skin cancer screening today. . Patient with history of BCC x 2  Advised on monthly self exams and 1 year  Patient Education: Appropriate brochures given.    1. Multiple benign appearing nevi on arms, legs and trunk. Discussed ABCDEs of melanoma and sunscreen.   2. Multiple lentigos on arms, legs and trunk. Advised benign, no treatment needed.  3. Multiple scattered angiomas. Advised benign, no treatment needed.   4. Seborrheic keratosis on arms, legs and trunk. Advised benign, no treatment needed.  5. Inflamed seborrheic keratosis on the left posterior shoulder x 1, left flank x 1, right hip x 1, right mid back x 1. As physically tender cryosurgery performed. Advised on post op care.   6. R/o BCC on the left upper arm Shave biopsy performed.  Area cleaned and anesthetized with 1% lidocaine with epinephrine.  Dermablade used to remove the lesion and sent to pathology. Bleeding was cauterized. Pt tolerated procedure well with no complications.   7. Mild seb derm in the left ear - start betamethasone cream BID x 1-2 weeks then PRN only            Follow-up: yearly FSE/PRN sooner    1.) Patient was asked about new and changing moles. YES  2.) Patient received a complete physical skin examination: YES  3.) Patient was counseled to perform a monthly self skin examination: YES  Scribed By: Kaye Richardson MS, PASHARON

## 2022-10-24 LAB
PATH REPORT.COMMENTS IMP SPEC: ABNORMAL
PATH REPORT.COMMENTS IMP SPEC: ABNORMAL
PATH REPORT.COMMENTS IMP SPEC: YES
PATH REPORT.FINAL DX SPEC: ABNORMAL
PATH REPORT.GROSS SPEC: ABNORMAL
PATH REPORT.MICROSCOPIC SPEC OTHER STN: ABNORMAL
PATH REPORT.RELEVANT HX SPEC: ABNORMAL

## 2022-11-03 ENCOUNTER — TELEPHONE (OUTPATIENT)
Dept: DERMATOLOGY | Facility: CLINIC | Age: 52
End: 2022-11-03

## 2022-11-03 NOTE — TELEPHONE ENCOUNTER
Called patient:  Patient notified and educated on test results   Mohs procedure explained- all questions answered  appointment scheduled- mohs packet mailed.    Thank you,    Xiomara VORN BSN  [facility]  [phone]

## 2022-11-03 NOTE — TELEPHONE ENCOUNTER
----- Message from Ashok Lee MD sent at 10/24/2022 11:54 AM CDT -----  A. Left upper arm:  - Basal cell carcinoma, superficial type -    Schedule excision

## 2022-12-14 NOTE — PROGRESS NOTES
Surgical Office Location :   [facility]  [address] [place] [address]

## 2022-12-20 ENCOUNTER — OFFICE VISIT (OUTPATIENT)
Dept: DERMATOLOGY | Facility: CLINIC | Age: 52
End: 2022-12-20
Payer: COMMERCIAL

## 2022-12-20 VITALS — DIASTOLIC BLOOD PRESSURE: 83 MMHG | HEART RATE: 85 BPM | SYSTOLIC BLOOD PRESSURE: 156 MMHG | OXYGEN SATURATION: 96 %

## 2022-12-20 DIAGNOSIS — C44.619 BASAL CELL CARCINOMA (BCC) OF LEFT UPPER ARM: Primary | ICD-10-CM

## 2022-12-20 PROCEDURE — 11602 EXC TR-EXT MAL+MARG 1.1-2 CM: CPT | Performed by: DERMATOLOGY

## 2022-12-20 PROCEDURE — 88331 PATH CONSLTJ SURG 1 BLK 1SPC: CPT | Performed by: DERMATOLOGY

## 2022-12-20 ASSESSMENT — PAIN SCALES - GENERAL: PAINLEVEL: NO PAIN (0)

## 2022-12-20 NOTE — LETTER
12/20/2022         RE: Aixa Coker  [address]  [facility] [place] [address]        Dear Colleague,    Thank you for referring your patient, Aixa Coker, to the [facility]. Please see a copy of my visit note below.    Surgical Office Location :   [facility]  [facility] [address] [place] [address]      Aixa Coker is an extremely pleasant 52 year old year old female patient here today for evaluation and managment of basal cell carcinoma on left upper arm. Patient has no other skin complaints today.  Remainder of the HPI, Meds, PMH, Allergies, FH, and SH was reviewed in chart.      Past Medical History:   Diagnosis Date     Basal cell carcinoma      Cancer (H)     Lip- basal cell carcinoma     Dyspareunia      Frequent UTI     Reports with increased intercourse     heavy menses     controlled with ablation       Past Surgical History:   Procedure Laterality Date     C/SECTION, LOW TRANSVERSE  11/99, 10/00     COLONOSCOPY N/A 07/26/2019    Procedure: COLONOSCOPY;  Surgeon: Madhu Sosa DO;  [facility]: [facility]     DILATE CERVIX, HYSTEROSCOPY, ABLATE ENDOMETRIUM NOVASURE, COMBINED  01/01/2010     TONSILLECTOMY & ADENOIDECTOMY  01/01/1976     [facility] ORAL SURGERY PROCEDURE  01/01/1988    [place] teeth        Family History   Problem Relation Age of Onset     Hypertension Mother      Cancer Mother         basal cell     Osteoporosis Mother      Basal cell carcinoma Mother      Cancer Father         squamous cell, skin     Prostate Cancer Father 68     Diabetes Father      C.A.D. Father      Basal cell carcinoma Father      Basal cell carcinoma Sister      Diabetes Sister         gestational diabetes     Respiratory Sister         asthma     Diabetes Paternal Grandmother        Social History     Socioeconomic History     Marital status:      Spouse name: Not on file     Number of children: 2     Years of education: Not on file     Highest  education level: Not on file   Occupational History     Employer: [employer]     Comment: middle school, techinical support   Tobacco Use     Smoking status: Never     Smokeless tobacco: Never   Substance and Sexual Activity     Alcohol use: Yes     Comment: Avg. 1-2 drinks per week     Drug use: No     Sexual activity: Yes     Partners: Male     Birth control/protection: Condom   Other Topics Concern     Parent/sibling w/ CABG, MI or angioplasty before 65F 55M? No   Social History Narrative     Not on file     Social Determinants of Health     Financial Resource Strain: Not on file   Food Insecurity: Not on file   Transportation Needs: Not on file   Physical Activity: Not on file   Stress: Not on file   Social Connections: Not on file   Intimate Partner Violence: Not on file   Housing Stability: Not on file       Outpatient Encounter Medications as of 12/20/2022   Medication Sig Dispense Refill     augmented betamethasone dipropionate (DIPROLENE AF) 0.05 % external cream Apply to ear BID x 1-2 weeks then PRN only. Do not apply to face 15 g 2     Calcium Carbonate-Vitamin D (CALCIUM 600/VITAMIN D PO)        conjugated estrogens (PREMARIN) cream Place 0.5 g vaginally twice a week (Patient not taking: Reported on 8/2/2019) 30 g 12     lactobacillus rhamnosus, GG, (CULTURELL) capsule Take 1 capsule by mouth 2 times daily       MAGNESIUM PO Take 2 tablets by mouth daily       No facility-administered encounter medications on file as of 12/20/2022.             O:   NAD, WDWN, Alert & Oriented, Mood & Affect wnl, Vitals stable   Here today alone   BP (!) 156/83   Pulse 85   SpO2 96%    General appearance normal   Vitals stable   Alert, oriented and in no acute distress     L arm 4mm pink pearly papule       Eyes: Conjunctivae/lids:Normal     ENT: Lips, buccal mucosa, tongue: normal    MSK:Normal    Cardiovascular: peripheral edema none    Pulm: Breathing Normal    Neuro/Psych: Orientation:Alert and  Orientedx3 ; Mood/Affect:normal       MICRO:   L arm:Unremarkable epidermis, dermis and superficial subcutis.  No concerning areas for malignancy.     A/P:  1. L arm basal cell carcinoma   EXCISION OF BASAL CELL CARCINOMA, Margins confirmed with FROZEN SECTIONS AND Second intent: [facility], consent obtained, anesthesia and prep, the margins of the lesion were identified and an incision was made encompassing the lesion with 4mm margin. The incisions were made through the skin and down to and including the superficial subcutis.  The lesion was removed en bloc and submitted for frozen section pathologic review. Clear margins obtained (1.2cm).    REPAIR SECOND INTENT: We discussed the options for wound management in full with the patient including risks/benefits/possible outcomes. Decision made to allow the wound to heal by second intention. EBL minimal; complications none; wound care routine.  The patient was discharged in good condition and will return in one month or prn for wound evaluation.       It was a pleasure speaking to Aixa Coker today.  Previous clinic notes and pertinent laboratory tests were reviewed prior to Aixa Coker's visit.  Patient encouraged to perform monthly skin exams.  UV precautions reviewed with patient.  Risks of non-melanoma skin cancer discussed with patient   Return to clinic 6 months        Again, thank you for allowing me to participate in the care of your patient.        Sincerely,        Ashok Lee MD

## 2022-12-20 NOTE — PROGRESS NOTES
Aixa Coker is an extremely pleasant 52 year old year old female patient here today for evaluation and managment of basal cell carcinoma on left upper arm. Patient has no other skin complaints today.  Remainder of the HPI, Meds, PMH, Allergies, FH, and SH was reviewed in chart.      Past Medical History:   Diagnosis Date     Basal cell carcinoma      Cancer (H)     Lip- basal cell carcinoma     Dyspareunia      Frequent UTI     Reports with increased intercourse     heavy menses     controlled with ablation       Past Surgical History:   Procedure Laterality Date     C/SECTION, LOW TRANSVERSE  11/99, 10/00     COLONOSCOPY N/A 07/26/2019    Procedure: COLONOSCOPY;  Surgeon: Madhu Sosa DO;  Location: [facility]     DILATE CERVIX, HYSTEROSCOPY, ABLATE ENDOMETRIUM NOVASURE, COMBINED  01/01/2010     TONSILLECTOMY & ADENOIDECTOMY  01/01/1976     [facility] ORAL SURGERY PROCEDURE  01/01/1988    [place] teeth        Family History   Problem Relation Age of Onset     Hypertension Mother      Cancer Mother         basal cell     Osteoporosis Mother      Basal cell carcinoma Mother      Cancer Father         squamous cell, skin     Prostate Cancer Father 68     Diabetes Father      C.A.D. Father      Basal cell carcinoma Father      Basal cell carcinoma Sister      Diabetes Sister         gestational diabetes     Respiratory Sister         asthma     Diabetes Paternal Grandmother        Social History     Socioeconomic History     Marital status:      Spouse name: Not on file     Number of children: 2     Years of education: Not on file     Highest education level: Not on file   Occupational History     Employer: [employer]     Comment: middle school, techinical support   Tobacco Use     Smoking status: Never     Smokeless tobacco: Never   Substance and Sexual Activity     Alcohol use: Yes     Comment: Avg. 1-2 drinks per week     Drug use: No     Sexual activity: Yes     Partners: Male     Birth  control/protection: Condom   Other Topics Concern     Parent/sibling w/ CABG, MI or angioplasty before 65F 55M? No   Social History Narrative     Not on file     Social Determinants of Health     Financial Resource Strain: Not on file   Food Insecurity: Not on file   Transportation Needs: Not on file   Physical Activity: Not on file   Stress: Not on file   Social Connections: Not on file   Intimate Partner Violence: Not on file   Housing Stability: Not on file       Outpatient Encounter Medications as of 12/20/2022   Medication Sig Dispense Refill     augmented betamethasone dipropionate (DIPROLENE AF) 0.05 % external cream Apply to ear BID x 1-2 weeks then PRN only. Do not apply to face 15 g 2     Calcium Carbonate-Vitamin D (CALCIUM 600/VITAMIN D PO)        conjugated estrogens (PREMARIN) cream Place 0.5 g vaginally twice a week (Patient not taking: Reported on 8/2/2019) 30 g 12     lactobacillus rhamnosus, GG, (CULTURELL) capsule Take 1 capsule by mouth 2 times daily       MAGNESIUM PO Take 2 tablets by mouth daily       No facility-administered encounter medications on file as of 12/20/2022.             O:   NAD, WDWN, Alert & Oriented, Mood & Affect wnl, Vitals stable   Here today alone   BP (!) 156/83   Pulse 85   SpO2 96%    General appearance normal   Vitals stable   Alert, oriented and in no acute distress     L arm 4mm pink pearly papule       Eyes: Conjunctivae/lids:Normal     ENT: Lips, buccal mucosa, tongue: normal    MSK:Normal    Cardiovascular: peripheral edema none    Pulm: Breathing Normal    Neuro/Psych: Orientation:Alert and Orientedx3 ; Mood/Affect:normal       MICRO:   L arm:Unremarkable epidermis, dermis and superficial subcutis.  No concerning areas for malignancy.     A/P:  1. L arm basal cell carcinoma   EXCISION OF BASAL CELL CARCINOMA, Margins confirmed with FROZEN SECTIONS AND Second intent: After thorough discussion of PGACAC, consent obtained, anesthesia and prep, the margins of the  lesion were identified and an incision was made encompassing the lesion with 4mm margin. The incisions were made through the skin and down to and including the superficial subcutis.  The lesion was removed en bloc and submitted for frozen section pathologic review. Clear margins obtained (1.2cm).    REPAIR SECOND INTENT: We discussed the options for wound management in full with the patient including risks/benefits/possible outcomes. Decision made to allow the wound to heal by second intention. EBL minimal; complications none; wound care routine.  The patient was discharged in good condition and will return in one month or prn for wound evaluation.       It was a pleasure speaking to Aixa Coker today.  Previous clinic notes and pertinent laboratory tests were reviewed prior to Aixa Coker's visit.  Patient encouraged to perform monthly skin exams.  UV precautions reviewed with patient.  Risks of non-melanoma skin cancer discussed with patient   Return to clinic 6 months

## 2022-12-20 NOTE — NURSING NOTE
"Initial BP (!) 156/83   Pulse 85   SpO2 96%  Estimated body mass index is 31.35 kg/m  as calculated from the following:    Height as of 8/2/19: 1.568 m (5' 1.75\").    Weight as of 8/2/19: 77.1 kg (170 lb). .      "

## 2022-12-20 NOTE — PATIENT INSTRUCTIONS
Open Wound Care     for left upper arm     No strenuous activity for 48 hours    Take Tylenol as needed for discomfort.                                                .         Do not drink alcoholic beverages for 48 hours.    Keep the pressure bandage in place for 24 hours. If the bandage becomes blood tinged or loose, reinforce it with gauze and tape.        (Refer to the reverse side of this page for management of bleeding).    Remove bandage in 24 hours and begin wound care as follows:     Clean area with tap water using a Q tip or gauze pad, (shower / bathe normally)  Dry wound with Q tip or gauze pad  Apply Aquaphor, Vaseline, Polysporin or Bacitracin Ointment with a Q tip  Do NOT use Neosporin Ointment *  Cover the wound with a band-aid or nonstick gauze pad and paper tape.  Repeat wound care once a day until wound is completely healed.    It is an old wives tale that a wound heals better when it is exposed to air and allowed to dry out. The wound will heal faster with a better cosmetic result if it is kept moist with ointment and covered with a bandage.  Do not let the wound dry out.      Supplies Needed:                Qtips or gauze pads                Polysporin or Bacitracin Ointment                Bandaids or nonstick gauze pads and paper tape    Wound care kits and brown paper tape are available for purchase at   the pharmacy.    BLEEDING:    Use tightly rolled up gauze or cloth to apply direct pressure over the bandage for 20   minutes.  Reapply pressure for an additional 20 minutes if necessary  Call the office or go to the nearest emergency room if pressure fails to stop the bleeding.  Use additional gauze and tape to maintain pressure once the bleeding has stopped.  Begin wound care 24 hours after surgery as directed.                  WOUND HEALING    One week after surgery a pink / red halo will form around the outside of the wound.   This is new skin.  The center of the wound will appear  yellowish white and produce some drainage.  The pink halo will slowly migrate in toward the center of the wound until the wound is covered with new shiny pink skin.  There will be no more drainage when the wound is completely healed.  It will take six months to one year for the redness to fade.  The scar may be itchy, tight and sensitive to extreme temperatures for a year after the surgery.  Massaging the area several times a day for several minutes after the wound is completely healed will help the scar soften and normalize faster. Begin massage only after healing is complete.    In case of emergency call: Dr Lee: [phone]    [facility]: [phone]    [facility]:[phone]    Additional Notes: Patient doesnât take any medication Quality 130: Documentation Of Current Medications In The Medical Record: Eligible clinician attests to documenting in the medical record the patient is not eligible for a current list of medications being obtained, updated, or reviewed by the eligible clinician Detail Level: Simple

## 2023-01-19 ENCOUNTER — OFFICE VISIT (OUTPATIENT)
Dept: FAMILY MEDICINE | Facility: CLINIC | Age: 53
End: 2023-01-19
Payer: COMMERCIAL

## 2023-01-19 ENCOUNTER — ANCILLARY PROCEDURE (OUTPATIENT)
Dept: GENERAL RADIOLOGY | Facility: CLINIC | Age: 53
End: 2023-01-19
Attending: FAMILY MEDICINE
Payer: COMMERCIAL

## 2023-01-19 VITALS
DIASTOLIC BLOOD PRESSURE: 78 MMHG | TEMPERATURE: 98.6 F | HEIGHT: 62 IN | WEIGHT: 176 LBS | SYSTOLIC BLOOD PRESSURE: 136 MMHG | HEART RATE: 75 BPM | BODY MASS INDEX: 32.39 KG/M2 | OXYGEN SATURATION: 98 %

## 2023-01-19 DIAGNOSIS — S59.901A INJURY OF RIGHT ELBOW, INITIAL ENCOUNTER: Primary | ICD-10-CM

## 2023-01-19 DIAGNOSIS — S59.901A INJURY OF RIGHT ELBOW, INITIAL ENCOUNTER: ICD-10-CM

## 2023-01-19 PROCEDURE — 99203 OFFICE O/P NEW LOW 30 MIN: CPT | Performed by: FAMILY MEDICINE

## 2023-01-19 PROCEDURE — 73080 X-RAY EXAM OF ELBOW: CPT | Mod: TC | Performed by: RADIOLOGY

## 2023-01-19 NOTE — PROGRESS NOTES
"  Assessment & Plan     Injury of right elbow, initial encounter  Likely hyperextension injury I do not see any bony abnormalities.  - XR Elbow Right G/E 3 Views; Future         BMI:   Estimated body mass index is 32.45 kg/m  as calculated from the following:    Height as of this encounter: 1.568 m (5' 1.75\").    Weight as of this encounter: 79.8 kg (176 lb).           No follow-ups on file.    Lisa Bonilla MD  [facility] DAVID Rivers is a 52 year old, presenting for the following health issues:  Elbow Pain (Last Friday fell on the ice, Her wrists hit the ice, but is having right elbow pain. Can't straighten arm. ) and Fall      History of Present Illness       Reason for visit:  Injured elbow  Symptom onset:  3-7 days ago    She eats 4 or more servings of fruits and vegetables daily.She consumes 0 sweetened beverage(s) daily.She exercises with enough effort to increase her heart rate 9 or less minutes per day.  She exercises with enough effort to increase her heart rate 3 or less days per week.   She is taking medications regularly.               Review of Systems   Constitutional, HEENT, cardiovascular, pulmonary, gi and gu systems are negative, except as otherwise noted.      Objective    /78   Pulse 75   Temp 98.6  F (37  C) (Tympanic)   Ht 1.568 m (5' 1.75\")   Wt 79.8 kg (176 lb)   SpO2 98%   BMI 32.45 kg/m    Body mass index is 32.45 kg/m .  Physical Exam   GENERAL: healthy, alert and no distress  MS: RUE exam shows normal strength and muscle mass and unable to fully extend or fully flex.  There is some swelling around the joint.  There are also 2 ecchymoses on the back of the arm there is no point tenderness  SKIN: ecchymoses - arms  NEURO: Normal strength and tone, mentation intact and speech normal  PSYCH: mentation appears normal, affect normal/bright    No results found for this or any previous visit (from the past 24 hour(s)).    Lisa Bonilla M.D.      "

## 2023-02-14 ENCOUNTER — OFFICE VISIT (OUTPATIENT)
Dept: FAMILY MEDICINE | Facility: CLINIC | Age: 53
End: 2023-02-14
Payer: COMMERCIAL

## 2023-02-14 VITALS
RESPIRATION RATE: 20 BRPM | HEART RATE: 77 BPM | WEIGHT: 175.19 LBS | DIASTOLIC BLOOD PRESSURE: 88 MMHG | BODY MASS INDEX: 32.24 KG/M2 | OXYGEN SATURATION: 97 % | TEMPERATURE: 98.4 F | HEIGHT: 62 IN | SYSTOLIC BLOOD PRESSURE: 140 MMHG

## 2023-02-14 DIAGNOSIS — N90.89 LABIAL AND CLITORAL ADHESIONS, ACQUIRED: ICD-10-CM

## 2023-02-14 DIAGNOSIS — Z12.4 CERVICAL CANCER SCREENING: ICD-10-CM

## 2023-02-14 DIAGNOSIS — Z13.220 SCREENING FOR HYPERLIPIDEMIA: ICD-10-CM

## 2023-02-14 DIAGNOSIS — Z11.4 SCREENING FOR HIV (HUMAN IMMUNODEFICIENCY VIRUS): ICD-10-CM

## 2023-02-14 DIAGNOSIS — L90.0 LICHEN SCLEROSUS: ICD-10-CM

## 2023-02-14 DIAGNOSIS — Z00.01 ENCOUNTER FOR ROUTINE ADULT MEDICAL EXAM WITH ABNORMAL FINDINGS: Primary | ICD-10-CM

## 2023-02-14 DIAGNOSIS — M25.521 RIGHT ELBOW PAIN: ICD-10-CM

## 2023-02-14 DIAGNOSIS — Z11.59 NEED FOR HEPATITIS C SCREENING TEST: ICD-10-CM

## 2023-02-14 LAB
ALBUMIN SERPL BCG-MCNC: 4.3 G/DL (ref 3.5–5.2)
ALP SERPL-CCNC: 86 U/L (ref 35–104)
ALT SERPL W P-5'-P-CCNC: 23 U/L (ref 10–35)
ANION GAP SERPL CALCULATED.3IONS-SCNC: 10 MMOL/L (ref 7–15)
AST SERPL W P-5'-P-CCNC: 24 U/L (ref 10–35)
BILIRUB SERPL-MCNC: 0.3 MG/DL
BUN SERPL-MCNC: 15.5 MG/DL (ref 6–20)
CALCIUM SERPL-MCNC: 9.8 MG/DL (ref 8.6–10)
CHLORIDE SERPL-SCNC: 105 MMOL/L (ref 98–107)
CHOLEST SERPL-MCNC: 199 MG/DL
CREAT SERPL-MCNC: 0.85 MG/DL (ref 0.51–0.95)
DEPRECATED HCO3 PLAS-SCNC: 27 MMOL/L (ref 22–29)
GFR SERPL CREATININE-BSD FRML MDRD: 81 ML/MIN/1.73M2
GLUCOSE SERPL-MCNC: 105 MG/DL (ref 70–99)
HCV AB SERPL QL IA: NONREACTIVE
HDLC SERPL-MCNC: 53 MG/DL
HIV 1+2 AB+HIV1 P24 AG SERPL QL IA: NONREACTIVE
LDLC SERPL CALC-MCNC: 120 MG/DL
NONHDLC SERPL-MCNC: 146 MG/DL
POTASSIUM SERPL-SCNC: 5 MMOL/L (ref 3.4–5.3)
PROT SERPL-MCNC: 7.1 G/DL (ref 6.4–8.3)
SODIUM SERPL-SCNC: 142 MMOL/L (ref 136–145)
TRIGL SERPL-MCNC: 132 MG/DL
TSH SERPL DL<=0.005 MIU/L-ACNC: 2.58 UIU/ML (ref 0.3–4.2)

## 2023-02-14 PROCEDURE — 99396 PREV VISIT EST AGE 40-64: CPT | Mod: 25 | Performed by: PHYSICIAN ASSISTANT

## 2023-02-14 PROCEDURE — 86803 HEPATITIS C AB TEST: CPT | Performed by: PHYSICIAN ASSISTANT

## 2023-02-14 PROCEDURE — 36415 COLL VENOUS BLD VENIPUNCTURE: CPT | Performed by: PHYSICIAN ASSISTANT

## 2023-02-14 PROCEDURE — 87389 HIV-1 AG W/HIV-1&-2 AB AG IA: CPT | Performed by: PHYSICIAN ASSISTANT

## 2023-02-14 PROCEDURE — 84443 ASSAY THYROID STIM HORMONE: CPT | Performed by: PHYSICIAN ASSISTANT

## 2023-02-14 PROCEDURE — 99213 OFFICE O/P EST LOW 20 MIN: CPT | Mod: 25 | Performed by: PHYSICIAN ASSISTANT

## 2023-02-14 PROCEDURE — 90750 HZV VACC RECOMBINANT IM: CPT | Performed by: PHYSICIAN ASSISTANT

## 2023-02-14 PROCEDURE — 90471 IMMUNIZATION ADMIN: CPT | Performed by: PHYSICIAN ASSISTANT

## 2023-02-14 PROCEDURE — 80061 LIPID PANEL: CPT | Performed by: PHYSICIAN ASSISTANT

## 2023-02-14 PROCEDURE — 80053 COMPREHEN METABOLIC PANEL: CPT | Performed by: PHYSICIAN ASSISTANT

## 2023-02-14 ASSESSMENT — ENCOUNTER SYMPTOMS
NERVOUS/ANXIOUS: 0
CONSTIPATION: 0
FREQUENCY: 0
HEARTBURN: 0
ABDOMINAL PAIN: 1
HEMATOCHEZIA: 0
ARTHRALGIAS: 0
WEAKNESS: 0
PALPITATIONS: 0
DYSURIA: 0
HEADACHES: 0
JOINT SWELLING: 0
PARESTHESIAS: 0
EYE PAIN: 0
NAUSEA: 0
COUGH: 0
HEMATURIA: 0
SORE THROAT: 0
FEVER: 0
CHILLS: 0
DIZZINESS: 0
MYALGIAS: 0
SHORTNESS OF BREATH: 0
DIARRHEA: 0

## 2023-02-14 ASSESSMENT — PAIN SCALES - GENERAL: PAINLEVEL: NO PAIN (0)

## 2023-02-14 NOTE — PROGRESS NOTES
"   SUBJECTIVE:   CC: Tita is an 52 year old who presents for preventive health visit.     Patient has been advised of split billing requirements and indicates understanding: Yes  Healthy Habits:     Getting at least 3 servings of Calcium per day:  Yes    Bi-annual eye exam:  NO    Dental care twice a year:  Yes    Sleep apnea or symptoms of sleep apnea:  Daytime drowsiness    Diet:  Regular (no restrictions)    Frequency of exercise:  None    Taking medications regularly:  Yes    Medication side effects:  None    PHQ-2 Total Score: 1    Additional concerns today:  Yes    1. Restricted movements of elbow, right  Fell/slipped on the ice in early January - landed mostly on her wrists but had pain in her right elbow  Was evaluated in clinic on 1/19 - xray was negative for fracture  Still having pain although that has improved a little  Main concern is that she doesn't have full ROM in that elbow - when she flexes at the elbow she cannot get a full flexion or full extension to 180    2. Vaginal health  States it is \"not good\"   She did see OB/GYN in 2019 and was diagnosed with adhesions and lichen sclerosus. She was prescribed a topical medication and was to apply and dilate then go back to see OB/GYN  She admits she didn't do any of that   Because of the adhesions - has not been able to have a PAP  Also notes that she will get a 'pinching' in her left lower abdomen - not sure if it's something overian?   She is menopausal - had an ablation several years ago and then her period never came back              Today's PHQ-2 Score:   PHQ-2 ( 1999 [employer]) 2/14/2023   Q1: Little interest or pleasure in doing things 0   Q2: Feeling down, depressed or hopeless 1   PHQ-2 Score 1   PHQ-2 Total Score (12-17 Years)- Positive if 3 or more points; Administer PHQ-A if positive -   Q1: Little interest or pleasure in doing things Not at all   Q2: Feeling down, depressed or hopeless Several days   PHQ-2 Score 1       Have you ever done " Advance Care Planning? (For example, a Health Directive, POLST, or a discussion with a medical provider or your loved ones about your wishes):     Social History     Tobacco Use     Smoking status: Never     Smokeless tobacco: Never   Substance Use Topics     Alcohol use: Yes     Comment: Avg. 1-2 drinks per week         Alcohol Use 2/14/2023   Prescreen: >3 drinks/day or >7 drinks/week? No   Prescreen: >3 drinks/day or >7 drinks/week? -     Reviewed orders with patient.  Reviewed health maintenance and updated orders accordingly - Yes  BP Readings from Last 3 Encounters:   02/14/23 (!) 140/88   01/19/23 136/78   12/20/22 (!) 156/83    Wt Readings from Last 3 Encounters:   02/14/23 79.5 kg (175 lb 3 oz)   01/19/23 79.8 kg (176 lb)   08/02/19 77.1 kg (170 lb)                    Breast Cancer Screening:    Breast CA Risk Assessment (FHS-7) 2/14/2023   Do you have a family history of breast, colon, or ovarian cancer? No / Unknown           Pertinent mammograms are reviewed under the imaging tab.    History of abnormal Pap smear: OVERDUE. SEE COMMENTS IN ASSESSMENT/PLAN  PAP / HPV 8/15/2013 8/15/2011 7/19/2010   PAP (Historical) NIL NIL NIL     Reviewed and updated as needed this visit by clinical staff   Tobacco  Allergies  Meds              Reviewed and updated as needed this visit by Provider                     Review of Systems   Constitutional: Negative for chills and fever.   HENT: Negative for congestion, ear pain, hearing loss and sore throat.    Eyes: Negative for pain and visual disturbance.   Respiratory: Negative for cough and shortness of breath.    Cardiovascular: Negative for chest pain, palpitations and peripheral edema.   Gastrointestinal: Positive for abdominal pain. Negative for constipation, diarrhea, heartburn, hematochezia and nausea.   Genitourinary: Negative for dysuria, frequency, genital sores, hematuria and urgency.   Musculoskeletal: Negative for arthralgias, joint swelling and myalgias.  "  Skin: Negative for rash.   Neurological: Negative for dizziness, weakness, headaches and paresthesias.   Psychiatric/Behavioral: Negative for mood changes. The patient is not nervous/anxious.      CONSTITUTIONAL: NEGATIVE for fever, chills, change in weight  INTEGUMENTARY/SKIN: NEGATIVE for worrisome rashes, moles or lesions  EYES: NEGATIVE for vision changes or irritation  ENT: NEGATIVE for ear, mouth and throat problems  RESP: NEGATIVE for significant cough or SOB  BREAST: NEGATIVE for masses, tenderness or discharge  CV: NEGATIVE for chest pain, palpitations or peripheral edema  GI: NEGATIVE for nausea, abdominal pain, heartburn, or change in bowel habits  : NEGATIVE for unusual urinary or vaginal symptoms. No vaginal bleeding.  MUSCULOSKELETAL: NEGATIVE for significant arthralgias or myalgia  NEURO: NEGATIVE for weakness, dizziness or paresthesias  PSYCHIATRIC: NEGATIVE for changes in mood or affect      OBJECTIVE:   BP (!) 140/88 (BP Location: Right arm, Patient Position: Sitting, Cuff Size: Adult Large)   Pulse 77   Temp 98.4  F (36.9  C) (Tympanic)   Resp 20   Ht 1.568 m (5' 1.75\")   Wt 79.5 kg (175 lb 3 oz)   SpO2 97%   BMI 32.30 kg/m    Physical Exam  GENERAL APPEARANCE: healthy, alert and no distress  EYES: Eyes grossly normal to inspection, PERRL and conjunctivae and sclerae normal  HENT: ear canals and TM's normal, nose and mouth without ulcers or lesions, oropharynx clear and oral mucous membranes moist  NECK: no adenopathy, no asymmetry, masses, or scars and thyroid normal to palpation  RESP: lungs clear to auscultation - no rales, rhonchi or wheezes  BREAST: normal without masses, tenderness or nipple discharge and no palpable axillary masses or adenopathy  CV: regular rate and rhythm, normal S1 S2, no S3 or S4, no murmur, click or rub, no peripheral edema and peripheral pulses strong  ABDOMEN: soft, nontender, no hepatosplenomegaly, no masses and bowel sounds normal  MS: no musculoskeletal " defects are noted and gait is age appropriate without ataxia  SKIN: no suspicious lesions or rashes  NEURO: Normal strength and tone, sensory exam grossly normal, mentation intact and speech normal  PSYCH: mentation appears normal and affect normal/bright    Diagnostic Test Results:  PENDING    ASSESSMENT/PLAN:   (Z00.01) Encounter for routine adult medical exam with abnormal findings  (primary encounter diagnosis)  Comment:  Plan: Lipid panel reflex to direct LDL Non-fasting,         TSH with free T4 reflex, Comprehensive         metabolic panel (BMP + Alb, Alk Phos, ALT, AST,        Total. Bili, TP)            (Z11.4) Screening for HIV (human immunodeficiency virus)  Comment:   Plan: HIV Antigen Antibody Combo            (Z11.59) Need for hepatitis C screening test  Comment:   Plan: Hepatitis C Screen Reflex to HCV RNA Quant and         Genotype            (Z12.4) Cervical cancer screening  Comment: unable to complete PAP due to adhesions. Need to get her back to OB/GYN and she understands she needs to follow through with treatment   Plan: Ob/Gyn Referral            (Z13.220) Screening for hyperlipidemia  Comment:   Plan: Lipid panel reflex to direct LDL Non-fasting            (M25.521) Right elbow pain  Comment: initial xray showed no occult fracture although was swelling so possible not visible.  but most of the issue is that she lost ROM. Expect PT to be a critical part of therapy but would like ortho to evaluate to determine if any further imaging would be needed   Plan: Orthopedic  Referral            (L90.0) Lichen sclerosus  Comment: last saw OB/GYN in 2019 and was given a topical steroid and instructed to work on dilating - she states she didn't follow through with either. 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    Weight as of this encounter: 79.5 kg (175 lb 3 oz).         She reports that she has never smoked. She has never used smokeless tobacco.      Gracia Luu PA-C  [facility]  "

## 2023-02-15 NOTE — PROGRESS NOTES
ASSESSMENT & PLAN    Aixa was seen today for pain.    Diagnoses and all orders for this visit:    Elbow injury, right, initial encounter  -     XR Elbow RT G/E 3 vw; Future  -     Hand Therapy Referral; Future    Closed displaced fracture of head of right radius, initial encounter  -     Hand Therapy Referral; Future    Other orders  -     Orthopedic  Referral      Mildly impacted radial head fracture, subacute, doing well, some expected limited ROM/stiffness post injury.  Hand therapy next.  Questions answered. Discussed signs and symptoms that may indicate more serious issues; the patient was instructed to seek appropriate care if noted. Tita indicates understanding of these issues and agrees with the plan.      See Patient Instructions  Patient Instructions   X-rays of the right elbow today demonstrate mildly impacted radial head fracture, at the lateral aspect, best seen on oblique view.  This is presumed to have been present since the original injury, just not clearly seen on the initial x-ray.  Given duration of time since the injury, and with current function, plan referral to hand therapy next, order has been placed.  Monitor course 3 to 4 weeks with hand therapy to start.  If getting good, sustained improvement in the elbow, we can leave follow-up open-ended.  Otherwise, contact clinic if any questions or concerns during that time.    If you have any further questions for your physician or physician s care team you can contact them thru MyChart or by calling  [phone] and use option 3 to leave a voice message.   Messages received during business hours will be returned same day.          Corbin Dai DO  [facility] [place]      CC: Gracia Luu    -----  Chief Complaint   Patient presents with     Right Elbow - Pain       SUBJECTIVE  Aixa Artemio Coker is a/an 52 year old female who is seen in consultation at the request of  Gracia Porter  "Bell TREJO for evaluation of right elbow pain.     The patient is seen by themselves.  The patient is Right handed    Onset: 1 month(s) ago. Patient describes injury as fell on the ice, Her wrists hit the ice, but is having right elbow pain. After the initial injury, patient could not straighten arm.  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 Noted immediate right elbow area pain. Does not recall striking right elbow, but did have some local bruising.  Not really much pain. No medication for pain currently.  \"uncomfortable\" at end of extension. Sometimes notes has to change position for sleep.  No noted joint noise.      REVIEW OF SYSTEMS:  Review of Systems      OBJECTIVE:  /87   Ht 1.568 m (5' 1.75\")   Wt 79.4 kg (175 lb)   BMI 32.27 kg/m     General: healthy, alert and in no distress  HEENT: no scleral icterus or conjunctival erythema  Skin: no suspicious lesions or rash. No jaundice.  CV: distal perfusion intact   Resp: normal respiratory effort without conversational dyspnea   Psych: normal mood and affect  Gait: NORMAL  Neuro: Normal light sensory exam of extremity       Right Elbow exam:    Inspection:     no ecchymosis       no edema or effusion    ROM:     flexion lacking ~5-10 deg compared to left, tightness posteriorly       extension ~20-25, lacking ~25 deg compared to left       forearm supination full       forearm pronation full    Strength: deferred with injury    Tender: none focal    Sensation:  Grossly intact      RADIOLOGY:  I independently ordered, visualized and reviewed these images with the patient  Mildly " impacted radial head fracture, intra-articular extension, seen on oblique view.    Recent Results (from the past 24 hour(s))   XR Elbow RT G/E 3 vw    Narrative    EXAM: XR ELBOW RIGHT G/E 3 VIEWS  LOCATION: [facility]  DATE/TIME: 2/18/2023 10:12 AM    INDICATION: Right elbow injury and pain.  COMPARISON: 01/19/2023.      Impression    IMPRESSION:   1.  New mildly impacted intra-articular fracture of the lateral aspect of the right radius head.  2.  Normal elbow joint alignment.  3.  Small elbow joint effusion.        Previous x-rays visualized/reviewed:  Elbow effusion, no clear fracture visualized.    XR ELBOW RIGHT G/E 3 VIEWS 1/19/2023 1:58 PM      HISTORY: Injury of right elbow, initial encounter     COMPARISON: None.                                                                      IMPRESSION: Elbow joint effusion. However no fracture lucencies are  evident. Follow-up radiographs could be performed to assess for an  occult fracture. Normal alignment.      ELOINA PEOPLES MD          Review of prior external note(s) from - referring  Review of the result(s) of each unique test - imaging  Independent interpretation of a test performed by another physician/other qualified health care professional (not separately reported) - imaging  Ordering of each unique test

## 2023-02-18 ENCOUNTER — OFFICE VISIT (OUTPATIENT)
Dept: ORTHOPEDICS | Facility: CLINIC | Age: 53
End: 2023-02-18
Attending: PHYSICIAN ASSISTANT
Payer: COMMERCIAL

## 2023-02-18 ENCOUNTER — ANCILLARY PROCEDURE (OUTPATIENT)
Dept: GENERAL RADIOLOGY | Facility: CLINIC | Age: 53
End: 2023-02-18
Attending: PEDIATRICS
Payer: COMMERCIAL

## 2023-02-18 VITALS
HEIGHT: 62 IN | WEIGHT: 175 LBS | SYSTOLIC BLOOD PRESSURE: 132 MMHG | BODY MASS INDEX: 32.2 KG/M2 | DIASTOLIC BLOOD PRESSURE: 87 MMHG

## 2023-02-18 DIAGNOSIS — M25.521 RIGHT ELBOW PAIN: ICD-10-CM

## 2023-02-18 DIAGNOSIS — S59.901A ELBOW INJURY, RIGHT, INITIAL ENCOUNTER: ICD-10-CM

## 2023-02-18 DIAGNOSIS — S52.121A CLOSED DISPLACED FRACTURE OF HEAD OF RIGHT RADIUS, INITIAL ENCOUNTER: ICD-10-CM

## 2023-02-18 DIAGNOSIS — S59.901A ELBOW INJURY, RIGHT, INITIAL ENCOUNTER: Primary | ICD-10-CM

## 2023-02-18 PROCEDURE — 99204 OFFICE O/P NEW MOD 45 MIN: CPT | Performed by: PEDIATRICS

## 2023-02-18 PROCEDURE — 73080 X-RAY EXAM OF ELBOW: CPT | Mod: TC | Performed by: RADIOLOGY

## 2023-02-18 ASSESSMENT — PAIN SCALES - GENERAL: PAINLEVEL: NO PAIN (0)

## 2023-02-18 NOTE — LETTER
2/18/2023         RE: Aixa Coker  [address]  [place] [address]        Dear Colleague,    Thank you for referring your patient, Aixa Coker, to the [facility] [place]. Please see a copy of my visit note below.    ASSESSMENT & PLAN    Aixa was seen today for pain.    Diagnoses and all orders for this visit:    Elbow injury, right, initial encounter  -     XR Elbow RT G/E 3 vw; Future  -     Hand Therapy Referral; Future    Closed displaced fracture of head of right radius, initial encounter  -     Hand Therapy Referral; Future    Other orders  -     Orthopedic  Referral      Mildly impacted radial head fracture, subacute, doing well, some expected limited ROM/stiffness post injury.  Hand therapy next.  Questions answered. Discussed signs and symptoms that may indicate more serious issues; the patient was instructed to seek appropriate care if noted. Tita indicates understanding of these issues and agrees with the plan.      See Patient Instructions  Patient Instructions   X-rays of the right elbow today demonstrate mildly impacted radial head fracture, at the lateral aspect, best seen on oblique view.  This is presumed to have been present since the original injury, just not clearly seen on the initial x-ray.  Given duration of time since the injury, and with current function, plan referral to hand therapy next, order has been placed.  Monitor course 3 to 4 weeks with hand therapy to start.  If getting good, sustained improvement in the elbow, we can leave follow-up open-ended.  Otherwise, contact clinic if any questions or concerns during that time.    If you have any further questions for your physician or physician s care team you can contact them thru MyChart or by calling  [phone] and use option 3 to leave a voice message.   Messages received during business hours will be returned same day.          DO NIDA Padilla  "[facility]      CC: Gracia Luu    -----  Chief Complaint   Patient presents with     Right Elbow - Pain       SUBJECTIVE  Aixa Artemio Coker is a/an 52 year old female who is seen in consultation at the request of  Gracia Luu PA-C for evaluation of right elbow pain.     The patient is seen by themselves.  The patient is Right handed    Onset: 1 month(s) ago. Patient describes injury as fell on the ice, Her wrists hit the ice, but is having right elbow pain. After the initial injury, patient could not straighten arm.  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 Noted immediate right elbow area pain. Does not recall striking right elbow, but did have some local bruising.  Not really much pain. No medication for pain currently.  \"uncomfortable\" at end of extension. Sometimes notes has to change position for sleep.  No noted joint noise.      REVIEW OF SYSTEMS:  Review of Systems      OBJECTIVE:  /87   Ht 1.568 m (5' 1.75\")   Wt 79.4 kg (175 lb)   BMI 32.27 kg/m     General: healthy, alert and in no distress  HEENT: no scleral icterus or conjunctival erythema  Skin: no suspicious lesions or rash. No jaundice.  CV: distal perfusion intact   Resp: normal respiratory effort without conversational dyspnea   Psych: normal mood and affect  Gait: NORMAL  Neuro: Normal light sensory exam of extremity       Right Elbow exam:    Inspection:     no ecchymosis       no edema or effusion    ROM:     flexion lacking ~5-10 deg compared to left, tightness posteriorly       extension ~20-25, " lacking ~25 deg compared to left       forearm supination full       forearm pronation full    Strength: deferred with injury    Tender: none focal    Sensation:  Grossly intact      RADIOLOGY:  I independently ordered, visualized and reviewed these images with the patient  Mildly impacted radial head fracture, intra-articular extension, seen on oblique view.    Recent Results (from the past 24 hour(s))   XR Elbow RT G/E 3 vw    Narrative    EXAM: XR ELBOW RIGHT G/E 3 VIEWS  LOCATION: [facility]  DATE/TIME: 2/18/2023 10:12 AM    INDICATION: Right elbow injury and pain.  COMPARISON: 01/19/2023.      Impression    IMPRESSION:   1.  New mildly impacted intra-articular fracture of the lateral aspect of the right radius head.  2.  Normal elbow joint alignment.  3.  Small elbow joint effusion.        Previous x-rays visualized/reviewed:  Elbow effusion, no clear fracture visualized.    XR ELBOW RIGHT G/E 3 VIEWS 1/19/2023 1:58 PM      HISTORY: Injury of right elbow, initial encounter     COMPARISON: None.                                                                      IMPRESSION: Elbow joint effusion. However no fracture lucencies are  evident. Follow-up radiographs could be performed to assess for an  occult fracture. Normal alignment.      CORBIN PEOPLES MD          Review of prior external note(s) from - referring  Review of the result(s) of each unique test - imaging  Independent interpretation of a test performed by another physician/other qualified health care professional (not separately reported) - imaging  Ordering of each unique test          Again, thank you for allowing me to participate in the care of your patient.        Sincerely,        Corbin Dai DO

## 2023-02-18 NOTE — PATIENT INSTRUCTIONS
X-rays of the right elbow today demonstrate mildly impacted radial head fracture, at the lateral aspect, best seen on oblique view.  This is presumed to have been present since the original injury, just not clearly seen on the initial x-ray.  Given duration of time since the injury, and with current function, plan referral to hand therapy next, order has been placed.  Monitor course 3 to 4 weeks with hand therapy to start.  If getting good, sustained improvement in the elbow, we can leave follow-up open-ended.  Otherwise, contact clinic if any questions or concerns during that time.    If you have any further questions for your physician or physician s care team you can contact them thru [employer] or by calling  [phone] and use option 3 to leave a voice message.   Messages received during business hours will be returned same day.

## 2023-02-23 ENCOUNTER — THERAPY VISIT (OUTPATIENT)
Dept: OCCUPATIONAL THERAPY | Facility: CLINIC | Age: 53
End: 2023-02-23
Attending: PEDIATRICS
Payer: COMMERCIAL

## 2023-02-23 DIAGNOSIS — M25.621 STIFFNESS OF ELBOW JOINT, RIGHT: Primary | ICD-10-CM

## 2023-02-23 DIAGNOSIS — S59.901A ELBOW INJURY, RIGHT, INITIAL ENCOUNTER: ICD-10-CM

## 2023-02-23 DIAGNOSIS — S52.121A CLOSED DISPLACED FRACTURE OF HEAD OF RIGHT RADIUS, INITIAL ENCOUNTER: ICD-10-CM

## 2023-02-23 PROCEDURE — 97140 MANUAL THERAPY 1/> REGIONS: CPT | Mod: GO | Performed by: OCCUPATIONAL THERAPIST

## 2023-02-23 PROCEDURE — 97110 THERAPEUTIC EXERCISES: CPT | Mod: GO | Performed by: OCCUPATIONAL THERAPIST

## 2023-02-23 PROCEDURE — 97165 OT EVAL LOW COMPLEX 30 MIN: CPT | Mod: GO | Performed by: OCCUPATIONAL THERAPIST

## 2023-02-23 NOTE — PROGRESS NOTES
"Hand Therapy Initial Evaluation    Current Date:  2023    Subjective:  Aixa (\"Tita\") Artemio Coker is a 53 year old right hand dominant female.    Diagnosis:   Right elbow radial head fracture  DOI:  2023 (therapy referral)    Patient reports symptoms of pain, stiffness/loss of motion, weakness/loss of strength and edema of the right elbow which occurred due to fall on ice 6 weeks ago. Since onset symptoms are gradually getting better.  Special tests:  x-ray.  Previous treatment: none.  General health as reported by patient is fair.  Pertinent medical history includes:Overweight  Medical allergies:none.  Surgical history: other: .  Medication history: see list in EMR.    Occupational Profile Information:  Current occupation is Technical Support [employer]  Currently working in normal job without restrictions  Job Tasks: Computer Work, Prolonged Sitting  Prior functional level:  independent-shared household chores  Barriers include:none  Mobility: No difficulty  Transportation: drives    Functional Outcome Measure:  Upper Extremity Functional Index  SCORE:   Column Totals: 64/80  (A lower score indicates greater disability.)    Pain Level (Scale 0-10)   2023   At Rest 0   With Use 4-5     Pain Description  Date 2023   Location elbow   Pain Quality Tender and Tightness   Frequency intermittent     Pain is worst  daytime   Exacerbated by  elbow ROM, lifting   Relieved by rest   Progression Gradually getting better     Edema  Mild    Sensation  WNL throughout all nerve distributions; per patient report    ROM  Pain Report: - none  + mild    ++ moderate    +++ severe   Elbow 2023   AROM (PROM) Left Right   Extension 0 -35 (-30)   Flexion 140 135   Supination 75 60   Pronation 85 85     Strength   (Measured in pounds)  Pain Report: - none  + mild    ++ moderate    +++ severe    2023   Trials Left Right   1  2  3 42  42  42 35+ slight  38+ slight  40+ slight "   Average 42 38     Assessment:  Patient presents with symptoms consistent with diagnosis of right elbow radial head fracture, with conservative intervention.     Patient's limitations or Problem List includes:  Pain, Decreased ROM/motion, Weakness, Decreased  and Tightness in musculature of the right elbow which interferes with the patient's ability to perform Self Care Tasks (dressing, bathing), Work Tasks, Sleep Patterns, Recreational Activities and Household Chores as compared to previous level of function.    Rehab Potential:  Excellent - Return to full activity, no limitations    Patient will benefit from skilled Occupational Therapy to increase ROM, flexibility, overall strength and  strength and decrease pain to return to previous activity level and resume normal daily tasks and to reach their rehab potential.    Barriers to Learning:  No barrier    Communication Issues:  Patient appears to be able to clearly communicate and understand verbal and written communication and follow directions correctly.    Chart Review: Chart Review and Simple history review with patient    Identified Performance Deficits: bathing/showering, dressing, home establishment and management, sleep, work and leisure activities    Assessment of Occupational Performance:  5 or more Performance Deficits    Clinical Decision Making (Complexity): Low complexity    Treatment Explanation:  The following has been discussed with the patient:  RX ordered/plan of care  Anticipated outcomes  Possible risks and side effects    Plan:  Frequency:  1 X week, once daily  Duration:  for 6 weeks  Treatment Plan:    Modalities:    Paraffin   Therapeutic Exercise:    AROM, PROM, Blocking and Isotonics  Manual Techniques:   Joint mobilization and Myofascial release  Orthotic Fabrication:    Static progressive  Self Care:    Self Care Tasks    Discharge Plan:  Achieve all LTG.  Independent in home treatment program.  Reach maximal therapeutic  benefit.    Home Exercise Program:  Warmth for stiffness  MFR to biceps to facilitate elbow extension   AROM elbow E/F with blocking  AROM forearm P/S  PROM elbow flexion  PROM elbow extension in gravity assisted position    Next Visit:  See in 1-2 weeks   Assess response to HEP  MFR, contract/relax and joint mobs to facilitate elbow ROM  Progress to isotonics as tolerated   Consider static progressive orthosis as indicated

## 2023-03-08 ENCOUNTER — THERAPY VISIT (OUTPATIENT)
Dept: OCCUPATIONAL THERAPY | Facility: CLINIC | Age: 53
End: 2023-03-08
Payer: COMMERCIAL

## 2023-03-08 DIAGNOSIS — S52.121A CLOSED DISPLACED FRACTURE OF HEAD OF RIGHT RADIUS, INITIAL ENCOUNTER: ICD-10-CM

## 2023-03-08 DIAGNOSIS — S59.901A ELBOW INJURY, RIGHT, INITIAL ENCOUNTER: ICD-10-CM

## 2023-03-08 DIAGNOSIS — M25.621 STIFFNESS OF ELBOW JOINT, RIGHT: Primary | ICD-10-CM

## 2023-03-08 PROCEDURE — 97140 MANUAL THERAPY 1/> REGIONS: CPT | Mod: GO | Performed by: OCCUPATIONAL THERAPIST

## 2023-03-08 PROCEDURE — 97110 THERAPEUTIC EXERCISES: CPT | Mod: GO | Performed by: OCCUPATIONAL THERAPIST

## 2023-03-08 NOTE — PROGRESS NOTES
SOAP note objective information for 3/8/2023:    Diagnosis:   Right elbow radial head fracture  DOI:  2/18/2023 (therapy referral)    Pain Level (Scale 0-10)   2/23/2023 3/8/2023   At Rest 0    With Use 4-5 4-5     ROM  Pain Report: - none  + mild    ++ moderate    +++ severe   Elbow 2/23/2023 2/23/2023 3/8/2023   AROM (PROM) Left Right Right   Extension 0 -35 (-30) -30 (-25)  -20   Flexion 140 135 135   Supination 75 60 70   Pronation 85 85 85     Home Exercise Program:  Warmth for stiffness  MFR to biceps to facilitate elbow extension   AROM elbow E/F with blocking  AROM forearm P/S  PROM elbow flexion  PROM elbow extension in gravity assisted position  Elbow extension stretch weight bearing on table with overpressure    Next Visit:  See weekly to facilitate elbow extension with MFR, contract/relax and joint mobs   Assess response to weight bearing with overpressure  Progress to isotonics as tolerated   Consider static progressive orthosis as indicated     Please refer to the daily flowsheet for treatment today, total treatment time and time spent performing 1:1 timed codes.

## 2023-03-23 ENCOUNTER — THERAPY VISIT (OUTPATIENT)
Dept: OCCUPATIONAL THERAPY | Facility: CLINIC | Age: 53
End: 2023-03-23
Payer: COMMERCIAL

## 2023-03-23 DIAGNOSIS — S52.121A CLOSED DISPLACED FRACTURE OF HEAD OF RIGHT RADIUS, INITIAL ENCOUNTER: ICD-10-CM

## 2023-03-23 DIAGNOSIS — S59.901A ELBOW INJURY, RIGHT, INITIAL ENCOUNTER: ICD-10-CM

## 2023-03-23 DIAGNOSIS — M25.621 STIFFNESS OF ELBOW JOINT, RIGHT: Primary | ICD-10-CM

## 2023-03-23 PROCEDURE — 97140 MANUAL THERAPY 1/> REGIONS: CPT | Mod: GO | Performed by: OCCUPATIONAL THERAPIST

## 2023-03-23 PROCEDURE — 97110 THERAPEUTIC EXERCISES: CPT | Mod: GO | Performed by: OCCUPATIONAL THERAPIST

## 2023-03-23 NOTE — PROGRESS NOTES
SOAP note objective information for 3/23/2023:    Diagnosis:   Right elbow radial head fracture  DOI:  2/18/2023 (therapy referral)    Pain Level (Scale 0-10)   2/23/2023 3/8/2023 3/23/2023   At Rest 0     With Use 4-5 4-5 3-4     ROM  Pain Report: - none  + mild    ++ moderate    +++ severe   Elbow 2/23/2023 2/23/2023 3/8/2023 3/23/2023   AROM (PROM) Left Right Right Right   Extension  After Tx 0 -35 (-30) -30 (-25)  -20 -20 (-15)  -10   Flexion 140 135 135 138   Supination 75 60 70    Pronation 85 85 85      Home Exercise Program:  Warmth for stiffness  MFR to biceps to facilitate elbow extension   AROM elbow E/F with blocking  AROM forearm P/S  PROM elbow flexion  PROM elbow extension in gravity assisted position  Elbow extension stretch weight bearing on table with overpressure  Elbow E/F/ isotonics     Next Visit:  See in 2-4 weeks to assess progress, cont MFR, contract/relax and joint mobs to improve extension  Assess response to elbow isotonics   Progress Report and UEFI, discharge to [facility] if continued progress    Please refer to the daily flowsheet for treatment today, total treatment time and time spent performing 1:1 timed codes.

## 2023-04-06 ENCOUNTER — THERAPY VISIT (OUTPATIENT)
Dept: OCCUPATIONAL THERAPY | Facility: CLINIC | Age: 53
End: 2023-04-06
Payer: COMMERCIAL

## 2023-04-06 DIAGNOSIS — M25.621 STIFFNESS OF ELBOW JOINT, RIGHT: Primary | ICD-10-CM

## 2023-04-06 DIAGNOSIS — S59.901A ELBOW INJURY, RIGHT, INITIAL ENCOUNTER: ICD-10-CM

## 2023-04-06 DIAGNOSIS — S52.121A CLOSED DISPLACED FRACTURE OF HEAD OF RIGHT RADIUS, INITIAL ENCOUNTER: ICD-10-CM

## 2023-04-06 PROCEDURE — 97140 MANUAL THERAPY 1/> REGIONS: CPT | Mod: GO | Performed by: OCCUPATIONAL THERAPIST

## 2023-04-06 PROCEDURE — 97110 THERAPEUTIC EXERCISES: CPT | Mod: GO | Performed by: OCCUPATIONAL THERAPIST

## 2023-04-06 NOTE — PROGRESS NOTES
Hand Therapy Progress/Discharge Note    Current Date:  4/6/2023    Reporting period is 2/23/2023 to 4/6/2023    Diagnosis:   Right elbow radial head fracture  DOI:  2/18/2023 (therapy referral)    Subjective:   Subjective changes noted by patient:  The elbow still doesn't quite straighten out all the way.  Not much pain anymore.  Functional changes noted by patient:  Improvement in Household Chores  Patient has noted adverse reaction to:  None    Functional Outcome Measure:  Upper Extremity Functional Index  SCORE:   Column Totals: 78/80  (A lower score indicates greater disability.)    Objective:  Pain Level (Scale 0-10)   2/23/2023 4/6/2023   At Rest 0 0   With Use 4-5 3     Pain Description  Date 2/23/2023 4/6/2023   Location elbow elbow   Pain Quality Tender and Tightness Tender and tight   Frequency intermittent   intermittent   Pain is worst  daytime daytime   Exacerbated by  elbow ROM, lifting Stretching elbow   Relieved by rest rest   Progression Gradually getting better Overall improved     Edema  Minimal     Sensation  WNL throughout all nerve distributions; per patient report    ROM  Elbow 2/23/2023 2/23/2023 4/6/2023   AROM (PROM) Left Right Right   Extension  After Tx 0 -35 (-30) -15 (-10)  -10 (-5)   Flexion 140 135 140   Supination 75 60 75   Pronation 85 85 85     Strength   (Measured in pounds)  Pain Report: - none  + mild    ++ moderate    +++ severe    2/23/2023 2/23/2023 4/6/2023   Trials Left Right Right   1  2  3 42  42  42 35+ slight  38+ slight  40+ slight 44-  47-  48-   Average 42 38 46     Please refer to the daily flowsheet for treatment provided today.     Assessment:  Response to therapy has been improvement to:  ROM of Elbow:  All Planes  Strength:    Pain:  intensity of pain is decreased    Overall Assessment:  Patient's symptoms are resolving and patient is independent in home exercise program  STG/LTG:  STGoals have been reviewed and progress or achievement has occurred;   see goal sheet for details and updates.  I have re-evaluated this patient and find that the nature, scope, duration and intensity of the therapy is appropriate for the medical condition of the patient.    Plan:  Frequency/Duration:  Discharge from Hand Therapy; continue home program.    Recommendations for Continued Therapy  Home Exercise Program:  Warmth for stiffness  MFR to biceps to facilitate elbow extension   AROM elbow E/F with blocking  AROM forearm P/S  PROM elbow flexion  PROM elbow extension in gravity assisted position, add light weight for additional stretch  Elbow extension stretch weight bearing on table with overpressure  Elbow E/F/ isotonics

## 2023-04-12 ENCOUNTER — OFFICE VISIT (OUTPATIENT)
Dept: OBGYN | Facility: CLINIC | Age: 53
End: 2023-04-12
Payer: COMMERCIAL

## 2023-04-12 VITALS
HEIGHT: 62 IN | DIASTOLIC BLOOD PRESSURE: 77 MMHG | BODY MASS INDEX: 31.21 KG/M2 | HEART RATE: 85 BPM | SYSTOLIC BLOOD PRESSURE: 146 MMHG | WEIGHT: 169.6 LBS | TEMPERATURE: 98.7 F | RESPIRATION RATE: 16 BRPM

## 2023-04-12 DIAGNOSIS — Z12.4 CERVICAL CANCER SCREENING: ICD-10-CM

## 2023-04-12 DIAGNOSIS — N95.2 VAGINAL ATROPHY: ICD-10-CM

## 2023-04-12 DIAGNOSIS — R19.00 PELVIC MASS: ICD-10-CM

## 2023-04-12 DIAGNOSIS — L90.0 LICHEN SCLEROSUS: ICD-10-CM

## 2023-04-12 DIAGNOSIS — R10.2 PELVIC PAIN IN FEMALE: Primary | ICD-10-CM

## 2023-04-12 PROCEDURE — 87624 HPV HI-RISK TYP POOLED RSLT: CPT | Performed by: OBSTETRICS & GYNECOLOGY

## 2023-04-12 PROCEDURE — G0145 SCR C/V CYTO,THINLAYER,RESCR: HCPCS | Performed by: OBSTETRICS & GYNECOLOGY

## 2023-04-12 PROCEDURE — 99203 OFFICE O/P NEW LOW 30 MIN: CPT | Performed by: OBSTETRICS & GYNECOLOGY

## 2023-04-12 RX ORDER — ESTRADIOL 10 UG/1
10 INSERT VAGINAL DAILY
Qty: 30 TABLET | Refills: 6 | Status: SHIPPED | OUTPATIENT
Start: 2023-04-12 | End: 2024-05-08

## 2023-04-12 RX ORDER — CLOBETASOL PROPIONATE 0.5 MG/G
OINTMENT TOPICAL 2 TIMES DAILY
Qty: 30 G | Refills: 4 | Status: SHIPPED | OUTPATIENT
Start: 2023-04-12 | End: 2024-05-08

## 2023-04-12 NOTE — PROGRESS NOTES
"Initial BP (!) 146/77 (BP Location: Left arm, Patient Position: Chair, Cuff Size: Adult Regular)   Pulse 85   Temp 98.7  F (37.1  C) (Tympanic)   Resp 16   Ht 1.568 m (5' 1.75\")   Wt 76.9 kg (169 lb 9.6 oz)   BMI 31.27 kg/m   Estimated body mass index is 31.27 kg/m  as calculated from the following:    Height as of this encounter: 1.568 m (5' 1.75\").    Weight as of this encounter: 76.9 kg (169 lb 9.6 oz). .      "

## 2023-04-12 NOTE — PROGRESS NOTES
[facility]  OB/GYN Clinic   Gynecology Consult Note    CC:  Chief Complaint   Patient presents with     Consult       HPI: Ms. Coker is a 53 year old  being seen for GYN consultation for pelvic pain.   Pain is always on the left side. Not an intense pain, doesn't take any medications. Feels like ovary is being squeezed. Symptoms have been many years. Pain comes randomly, every few weeks. Pain resolves quickly. No alleviating or aggevating symptoms.   Using probiotics and increasing water to help with occasional constipated. Haven't noticed worsening of pain with constipation. No blood in stools.   No dysuria or hematuria. Has more urgency symptoms with age and then some leakage.   Not using treatments for vulva.   No vaginal bleeding.   She has h/o ablation in , amenorrheic since then. Had vaginal bleeding with COVID shot in spring of 2021. Very light bleeding that lasted 2-3 days. Had some lymph node swelling. No bleeding since then.     She has h/o vaginitis, vulvar issues - significant vulvovaginal atrophy and lichen sclerosis, but hasn't used topical treatments or dilators.   She and her  are not sexually active.     GYN Hx: No STIs. No abnormal PAP smears.     ROS: A 10 pt ROS was completed and found to be otherwise negative unless mentioned in the HPI.     PMH:   Past Medical History:   Diagnosis Date     Basal cell carcinoma      Cancer (H)     Lip- basal cell carcinoma     Dyspareunia      Frequent UTI     Reports with increased intercourse     heavy menses     controlled with ablation       PSHx:  Past Surgical History:   Procedure Laterality Date     C/SECTION, LOW TRANSVERSE  , 10/00     COLONOSCOPY N/A 2019    Procedure: COLONOSCOPY;  Surgeon: Madhu Sosa DO;  Location: [facility]     DILATE CERVIX, HYSTEROSCOPY, ABLATE ENDOMETRIUM NOVASURE, COMBINED  2010     TONSILLECTOMY & ADENOIDECTOMY  1976     [facility] ORAL SURGERY PROCEDURE   1988    [place] teeth       OBHx:   OB History    Para Term  AB Living   2 2 2 0 0 2   SAB IAB Ectopic Multiple Live Births   0 0 0 0 2      # Outcome Date GA Lbr Omari/2nd Weight Sex Delivery Anes PTL Lv   2 Term  37w0d  3.033 kg (6 lb 11 oz) M -SEC   DELVIN      Birth Comments: s/s HELLP   1 Term  40w0d  3.487 kg (7 lb 11 oz) M -SEC   DELVIN       Medications:   augmented betamethasone dipropionate (DIPROLENE AF) 0.05 % external cream, Apply to ear BID x 1-2 weeks then PRN only. Do not apply to face (Patient not taking: Reported on 2023)  Calcium Carbonate-Vitamin D (CALCIUM 600/VITAMIN D PO),   lactobacillus rhamnosus (GG) (CULTURELL) capsule, Take 1 capsule by mouth 2 times daily  MAGNESIUM PO, Take 2 tablets by mouth daily    No current facility-administered medications on file prior to visit.      Allergies:      Allergies   Allergen Reactions     Nka [No Known Allergies]        Social History: Works at [employer] in .   Social History     Socioeconomic History     Marital status:      Spouse name: Not on file     Number of children: 2     Years of education: Not on file     Highest education level: Not on file   Occupational History     Employer: [employer]     Comment: middle school, techinical support   Tobacco Use     Smoking status: Never     Smokeless tobacco: Never   Vaping Use     Vaping status: Not on file   Substance and Sexual Activity     Alcohol use: Yes     Comment: Avg. 1-2 drinks per week     Drug use: No     Sexual activity: Yes     Partners: Male     Birth control/protection: Condom   Other Topics Concern     Parent/sibling w/ CABG, MI or angioplasty before 65F 55M? No   Social History Narrative     Not on file     Social Determinants of Health     Financial Resource Strain: Not on file   Food Insecurity: Not on file   Transportation Needs: Not on file   Physical Activity: Not on file   Stress: Not on file   Social Connections: Not  "on file   Intimate Partner Violence: Not on file   Housing Stability: Not on file     Social History     Socioeconomic History     Marital status:      Number of children: 2   Occupational History     Employer: [employer]     Comment: middle school, techinical support   Tobacco Use     Smoking status: Never     Smokeless tobacco: Never   Substance and Sexual Activity     Alcohol use: Yes     Comment: Avg. 1-2 drinks per week     Drug use: No     Sexual activity: Yes     Partners: Male     Birth control/protection: Condom   Other Topics Concern     Parent/sibling w/ CABG, MI or angioplasty before 65F 55M? No       Family History:   Family History   Problem Relation Age of Onset     Hypertension Mother      Cancer Mother         basal cell     Osteoporosis Mother      Basal cell carcinoma Mother      Cancer Father         squamous cell, skin     Prostate Cancer Father 68     Diabetes Father      C.A.D. Father      Basal cell carcinoma Father      Basal cell carcinoma Sister      Diabetes Sister         gestational diabetes     Respiratory Sister         asthma     Diabetes Paternal Grandmother      Denies any family hx of uterine cancer, ovarian or breast.   Sister with ?cervical cancer or pre-cancer.     Physical Exam:   Vitals:    04/12/23 1043   BP: (!) 146/77   BP Location: Left arm   Patient Position: Chair   Cuff Size: Adult Regular   Pulse: 85   Resp: 16   Temp: 98.7  F (37.1  C)   TempSrc: Tympanic   Weight: 76.9 kg (169 lb 9.6 oz)   Height: 1.568 m (5' 1.75\")      Estimated body mass index is 31.27 kg/m  as calculated from the following:    Height as of this encounter: 1.568 m (5' 1.75\").    Weight as of this encounter: 76.9 kg (169 lb 9.6 oz).    Gen: Pleasant, talkative female in no apparent distress   Respiratory: breathing comfortably on room air   Cardiac: Regular rate, warm and well-perfused.   GI: Abd soft and non-tender  : External genitalia with profound vulovaginal atrophy with " tissue thinning, adhesion of the labial over the clitoris to just superior to the introitus. Significant narrowing of the introitus, only able top accommodate my pinky finger. Lichenification of the tissues in figure -8 pattern. Cerviix is small and postmenopausal, uterus and adnexa without enlargement or pain.   MSK: Grossly normal movement of all four extremities  Psych: mood and affect bright   Lower extremity: edema not present       A&P: Ms. Coker is a 53 year old  being seen for GYN consultation for pelvic pain.   Plan pelvic US, will need to be transabdominal given narrowed introitus.   Recommended vaginal estrogen for atrophy, prn dilators and then clobetasol for lichen sclerosis. Recommended an annual GYN exam of the vulvar and to seek exam if any spot becomes raised or itchy, given association with vulvar malignancy.   Will also assess EMS with pelvic US given PMB. If >4mm, will need hysteroscopy, D&C. Will not be able to do EMB in office given vulvar changes.     I spent 30 min with the patient and 10 min in documentation .     Karen Mcdonald MD  OB/GYN  2023

## 2023-04-14 LAB
BKR LAB AP GYN ADEQUACY: NORMAL
BKR LAB AP GYN INTERPRETATION: NORMAL
BKR LAB AP HPV REFLEX: NORMAL
BKR LAB AP PREVIOUS ABNORMAL: NORMAL
PATH REPORT.COMMENTS IMP SPEC: NORMAL
PATH REPORT.COMMENTS IMP SPEC: NORMAL
PATH REPORT.RELEVANT HX SPEC: NORMAL

## 2023-04-17 ENCOUNTER — HOSPITAL ENCOUNTER (OUTPATIENT)
Dept: ULTRASOUND IMAGING | Facility: CLINIC | Age: 53
Discharge: HOME | End: 2023-04-17
Attending: OBSTETRICS & GYNECOLOGY | Admitting: OBSTETRICS & GYNECOLOGY
Payer: COMMERCIAL

## 2023-04-17 DIAGNOSIS — R10.2 PELVIC PAIN IN FEMALE: ICD-10-CM

## 2023-04-17 LAB — RADIOLOGIST FLAGS: ABNORMAL

## 2023-04-17 PROCEDURE — 76856 US EXAM PELVIC COMPLETE: CPT

## 2023-04-18 ENCOUNTER — ALLIED HEALTH/NURSE VISIT (OUTPATIENT)
Dept: FAMILY MEDICINE | Facility: CLINIC | Age: 53
End: 2023-04-18
Payer: COMMERCIAL

## 2023-04-18 ENCOUNTER — LAB (OUTPATIENT)
Dept: LAB | Facility: CLINIC | Age: 53
End: 2023-04-18
Payer: COMMERCIAL

## 2023-04-18 DIAGNOSIS — Z23 NEED FOR VACCINATION: Primary | ICD-10-CM

## 2023-04-18 DIAGNOSIS — R19.00 PELVIC MASS: ICD-10-CM

## 2023-04-18 LAB
CANCER AG125 SERPL-ACNC: 8 U/ML
CEA SERPL-MCNC: 0.8 NG/ML
HUMAN PAPILLOMA VIRUS 16 DNA: NEGATIVE
HUMAN PAPILLOMA VIRUS 18 DNA: NEGATIVE
HUMAN PAPILLOMA VIRUS FINAL DIAGNOSIS: NORMAL
HUMAN PAPILLOMA VIRUS OTHER HR: NEGATIVE

## 2023-04-18 PROCEDURE — 86301 IMMUNOASSAY TUMOR CA 19-9: CPT | Mod: 90

## 2023-04-18 PROCEDURE — 36415 COLL VENOUS BLD VENIPUNCTURE: CPT

## 2023-04-18 PROCEDURE — 90471 IMMUNIZATION ADMIN: CPT

## 2023-04-18 PROCEDURE — 99000 SPECIMEN HANDLING OFFICE-LAB: CPT

## 2023-04-18 PROCEDURE — 82378 CARCINOEMBRYONIC ANTIGEN: CPT

## 2023-04-18 PROCEDURE — 90750 HZV VACC RECOMBINANT IM: CPT

## 2023-04-18 PROCEDURE — 99207 PR NO CHARGE NURSE ONLY: CPT

## 2023-04-18 PROCEDURE — 86304 IMMUNOASSAY TUMOR CA 125: CPT

## 2023-04-18 NOTE — PROGRESS NOTES
Prior to immunization administration, verified patients identity using patient s name and date of birth. Please see Immunization Activity for additional information.     Screening Questionnaire for Adult Immunization    Are you sick today?   No   Do you have allergies to medications, food, a vaccine component or latex?   No   Have you ever had a serious reaction after receiving a vaccination?   No   Do you have a long-term health problem with heart, lung, kidney, or metabolic disease (e.g., diabetes), asthma, a blood disorder, no spleen, complement component deficiency, a cochlear implant, or a spinal fluid leak?  Are you on long-term aspirin therapy?   No   Do you have cancer, leukemia, HIV/AIDS, or any other immune system problem?   No   Do you have a parent, brother, or sister with an immune system problem?   No   In the past 3 months, have you taken medications that affect  your immune system, such as prednisone, other steroids, or anticancer drugs; drugs for the treatment of rheumatoid arthritis, Crohn s disease, or psoriasis; or have you had radiation treatments?   No   Have you had a seizure, or a brain or other nervous system problem?   No   During the past year, have you received a transfusion of blood or blood    products, or been given immune (gamma) globulin or antiviral drug?   No   For women: Are you pregnant or is there a chance you could become       pregnant during the next month?   No   Have you received any vaccinations in the past 4 weeks?   No     Immunization questionnaire answers were all negative.    I have reviewed the following standing orders:   This patient is due and qualifies for the Zoster vaccine.    Click here for Zoster Standing Order    I have reviewed the vaccines inclusion and exclusion criteria; No concerns regarding eligibility.         Injection of Shingrix given by Radha Del Rosario. Patient instructed to remain in clinic for 15 minutes afterwards, and to report any adverse  reactions.     Screening performed by Radha Del Rosario on 4/18/2023 at 3:20 PM.

## 2023-04-19 ENCOUNTER — HOSPITAL ENCOUNTER (OUTPATIENT)
Dept: MRI IMAGING | Facility: CLINIC | Age: 53
Discharge: HOME | End: 2023-04-19
Attending: OBSTETRICS & GYNECOLOGY | Admitting: OBSTETRICS & GYNECOLOGY
Payer: COMMERCIAL

## 2023-04-19 DIAGNOSIS — R10.2 PELVIC PAIN IN FEMALE: ICD-10-CM

## 2023-04-19 PROCEDURE — 258N000003 HC RX IP 258 OP 636: Performed by: OBSTETRICS & GYNECOLOGY

## 2023-04-19 PROCEDURE — 72197 MRI PELVIS W/O & W/DYE: CPT

## 2023-04-19 PROCEDURE — A9585 GADOBUTROL INJECTION: HCPCS | Performed by: OBSTETRICS & GYNECOLOGY

## 2023-04-19 PROCEDURE — 255N000002 HC RX 255 OP 636: Performed by: OBSTETRICS & GYNECOLOGY

## 2023-04-19 RX ORDER — GADOBUTROL 604.72 MG/ML
7.5 INJECTION INTRAVENOUS ONCE
Status: COMPLETED | OUTPATIENT
Start: 2023-04-19 | End: 2023-04-19

## 2023-04-19 RX ADMIN — SODIUM CHLORIDE 50 ML: 9 INJECTION, SOLUTION INTRAVENOUS at 18:11

## 2023-04-19 RX ADMIN — GADOBUTROL 7.5 ML: 604.72 INJECTION INTRAVENOUS at 18:10

## 2023-04-20 LAB — CANCER AG19-9 SERPL IA-ACNC: 4 U/ML

## 2023-10-26 ENCOUNTER — OFFICE VISIT (OUTPATIENT)
Dept: DERMATOLOGY | Facility: CLINIC | Age: 53
End: 2023-10-26
Payer: COMMERCIAL

## 2023-10-26 DIAGNOSIS — L82.0 INFLAMED SEBORRHEIC KERATOSIS: ICD-10-CM

## 2023-10-26 DIAGNOSIS — Z85.828 HISTORY OF NONMELANOMA SKIN CANCER: Primary | ICD-10-CM

## 2023-10-26 DIAGNOSIS — L81.4 LENTIGO: ICD-10-CM

## 2023-10-26 DIAGNOSIS — L82.1 SEBORRHEIC KERATOSES: ICD-10-CM

## 2023-10-26 DIAGNOSIS — D22.9 NEVUS: ICD-10-CM

## 2023-10-26 DIAGNOSIS — L30.9 DERMATITIS: ICD-10-CM

## 2023-10-26 DIAGNOSIS — D18.01 ANGIOMA OF SKIN: ICD-10-CM

## 2023-10-26 PROCEDURE — 17110 DESTRUCTION B9 LES UP TO 14: CPT | Performed by: PHYSICIAN ASSISTANT

## 2023-10-26 PROCEDURE — 99213 OFFICE O/P EST LOW 20 MIN: CPT | Mod: 25 | Performed by: PHYSICIAN ASSISTANT

## 2023-10-26 RX ORDER — BETAMETHASONE DIPROPIONATE 0.5 MG/G
CREAM TOPICAL
Qty: 15 G | Refills: 2 | Status: SHIPPED | OUTPATIENT
Start: 2023-10-26

## 2023-10-26 ASSESSMENT — PAIN SCALES - GENERAL: PAINLEVEL: NO PAIN (0)

## 2023-10-26 NOTE — PROGRESS NOTES
HPI:   Chief complaints: Aixa Coker is a 53 year old female who presents for Full skin cancer screening to rule out skin cancer   Last Skin Exam:6 mo ago     1st Baseline: no  Personal HX of Skin Cancer: Yes BCC x 3   Personal HX of Malignant Melanoma: no   Family HX of Skin Cancer / Malignant Melanoma: no  Personal HX of Atypical Moles:   no  Risk factors: history of frequent sun exposure, history of BCC  New / Changing lesions: None  Social hx: has two sons in their 20s  On review of systems, there are no further skin complaints, patient is feeling otherwise well.  See patient intake sheet.  ROS of the following were done and are negative: Constitutional, Eyes, Ears, Nose,   Mouth, Throat, Cardiovascular, Respiratory, GI, Genitourinary, Musculoskeletal,   Psychiatric, Endocrine, Allergic/Immunologic.    PHYSICAL EXAM:   There were no vitals taken for this visit.  Skin exam performed as follows: Type 2 skin. Mood appropriate  Alert and Oriented X 3. Well developed, well nourished in no distress.  General appearance: Normal  Head including face: Normal  Eyes: conjunctiva and lids: Normal  Mouth: Lips, teeth, gums: Normal  Neck: Normal  Chest-breast/axillae: Normal  Back: Normal  Spleen and liver: Normal  Cardiovascular: Exam of peripheral vascular system by observation for swelling, varicosities, edema: Normal  Genitalia: groin, buttocks: Normal  Extremities: digits/nails (clubbing): Normal  Eccrine and Apocrine glands: Normal  Right upper extremity: Normal  Left upper extremity: Normal  Right lower extremity: Normal  Left lower extremity: Normal  Skin: Scalp and body hair: See below    Pt deferred exam of breasts, groin, buttocks: No    Other physical findings:  1. Multiple pigmented macules on extremities and trunk  2. Multiple pigmented macules on face, trunk and extremities  3. Multiple vascular papules on trunk, arms and legs  4. Multiple scattered keratotic plaques  5. Inflamed keratotic papule on the  abdomen x 10, back x 2         Except as noted above, no other signs of skin cancer or melanoma.     ASSESSMENT/PLAN:   Benign Full skin cancer screening today. . Patient with history of BCC x 3  Advised on monthly self exams and 1 year  Patient Education: Appropriate brochures given.    Multiple benign appearing nevi on arms, legs and trunk. Discussed ABCDEs of melanoma and sunscreen.   Multiple lentigos on arms, legs and trunk. Advised benign, no treatment needed.  Multiple scattered angiomas. Advised benign, no treatment needed.   Seborrheic keratosis on arms, legs and trunk. Advised benign, no treatment needed.  Inflamed seborrheic keratosis on the abdomen x 10, back x 2. As physically tender cryosurgery performed. Advised on post op care.   Mild seb derm in the left ear - continue betamethasone cream PRN            Follow-up: yearly FSE/PRN sooner    1.) Patient was asked about new and changing moles. YES  2.) Patient received a complete physical skin examination: YES  3.) Patient was counseled to perform a monthly self skin examination: YES  Scribed By: Kaye Richardson, MS, PA-C

## 2023-10-26 NOTE — LETTER
10/26/2023         RE: Aixa Coker  [address]  [place] [address]        Dear Colleague,    Thank you for referring your patient, Aixa Coker, to the [facility]. Please see a copy of my visit note below.    HPI:   Chief complaints: Aixa Coker is a 53 year old female who presents for Full skin cancer screening to rule out skin cancer   Last Skin Exam:6 mo ago     1st Baseline: no  Personal HX of Skin Cancer: Yes BCC x 3   Personal HX of Malignant Melanoma: no   Family HX of Skin Cancer / Malignant Melanoma: no  Personal HX of Atypical Moles:   no  Risk factors: history of frequent sun exposure, history of BCC  New / Changing lesions: None  Social hx: has two sons in their 20s  On review of systems, there are no further skin complaints, patient is feeling otherwise well.  See patient intake sheet.  ROS of the following were done and are negative: Constitutional, Eyes, Ears, Nose,   Mouth, Throat, Cardiovascular, Respiratory, GI, Genitourinary, Musculoskeletal,   Psychiatric, Endocrine, Allergic/Immunologic.    PHYSICAL EXAM:   There were no vitals taken for this visit.  Skin exam performed as follows: Type 2 skin. Mood appropriate  Alert and Oriented X 3. Well developed, well nourished in no distress.  General appearance: Normal  Head including face: Normal  Eyes: conjunctiva and lids: Normal  Mouth: Lips, teeth, gums: Normal  Neck: Normal  Chest-breast/axillae: Normal  Back: Normal  Spleen and liver: Normal  Cardiovascular: Exam of peripheral vascular system by observation for swelling, varicosities, edema: Normal  Genitalia: groin, buttocks: Normal  Extremities: digits/nails (clubbing): Normal  Eccrine and Apocrine glands: Normal  Right upper extremity: Normal  Left upper extremity: Normal  Right lower extremity: Normal  Left lower extremity: Normal  Skin: Scalp and body hair: See below    Pt deferred exam of breasts, groin, buttocks: No    Other  physical findings:  1. Multiple pigmented macules on extremities and trunk  2. Multiple pigmented macules on face, trunk and extremities  3. Multiple vascular papules on trunk, arms and legs  4. Multiple scattered keratotic plaques  5. Inflamed keratotic papule on the abdomen x 10, back x 2         Except as noted above, no other signs of skin cancer or melanoma.     ASSESSMENT/PLAN:   Benign Full skin cancer screening today. . Patient with history of BCC x 3  Advised on monthly self exams and 1 year  Patient Education: Appropriate brochures given.    Multiple benign appearing nevi on arms, legs and trunk. Discussed ABCDEs of melanoma and sunscreen.   Multiple lentigos on arms, legs and trunk. Advised benign, no treatment needed.  Multiple scattered angiomas. Advised benign, no treatment needed.   Seborrheic keratosis on arms, legs and trunk. Advised benign, no treatment needed.  Inflamed seborrheic keratosis on the abdomen x 10, back x 2. As physically tender cryosurgery performed. Advised on post op care.   Mild seb derm in the left ear - continue betamethasone cream PRN            Follow-up: yearly FSE/PRN sooner    1.) Patient was asked about new and changing moles. YES  2.) Patient received a complete physical skin examination: YES  3.) Patient was counseled to perform a monthly self skin examination: YES  Scribed By: Kaye Richardson, MS, PASHARON        Again, thank you for allowing me to participate in the care of your patient.        Sincerely,        Kaye Richardson PA-C

## 2023-10-26 NOTE — PATIENT INSTRUCTIONS
Patient Education       Proper skin care from [facility]:    -Eliminate harsh soaps as they strip the natural oils from the skin, often resulting in dry itchy skin ( i.e. Dial, Zest, Albanian Spring)  -Use mild soaps such as Cetaphil or Dove Sensitive Skin in the shower. You do not need to use soap on arms, legs, and trunk every time you shower unless visibly soiled.   -Avoid hot or cold showers.  -After showering, lightly dry off and apply moisturizing within 2-3 minutes. This will help trap moisture in the skin.   -Aggressive use of a moisturizer at least 1-2 times a day to the entire body (including -Vanicream, Cetaphil, Aquaphor or Cerave) and moisturize hands after every washing.  -We recommend using moisturizers that come in a tub that needs to be scooped out, not a pump. This has more of an oil base. It will hold moisture in your skin much better than a water base moisturizer. The above recommended are non-pore clogging.      Wear a sunscreen with at least SPF 30 on your face, ears, neck and V of the chest daily. Wear sunscreen on other areas of the body if those areas are exposed to the sun throughout the day. Sunscreens can contain physical and/or chemical blockers. Physical blockers are less likely to clog pores, these include zinc oxide and titanium dioxide. Reapply every two hour and after swimming.     Sunscreen examples: https://www.ewg.org/sunscreen/    UV radiation  UVA radiation remains constant throughout the day and throughout the year. It is a longer wavelength than UVB and therefore penetrates deeper into the skin leading to immediate and delayed tanning, photoaging, and skin cancer. 70-80% of UVA and UVB radiation occurs between the hours of 10am-2pm.  UVB radiation  UVB radiation causes the most harmful effects and is more significant during the summer months. However, snow and ice can reflect UVB radiation leading to skin damage during the winter months as well. UVB radiation is  responsible for tanning, burning, inflammation, delayed erythema (pinkness), pigmentation (brown spots), and skin cancer.     I recommend self monthly full body exams and yearly full body exams with a dermatology provider. If you develop a new or changing lesion please follow up for examination. Most skin cancers are pink and scaly or pink and pearly. However, we do see blue/brown/black skin cancers.  Consider the ABCDEs of melanoma when giving yourself your monthly full body exam ( don't forget the groin, buttocks, feet, toes, etc). A-asymmetry, B-borders, C-color, D-diameter, E-elevation or evolving. If you see any of these changes please follow up in clinic. If you cannot see your back I recommend purchasing a hand held mirror to use with a larger wall mirror.       Checking for Skin Cancer  You can find cancer early by checking your skin each month. There are 3 kinds of skin cancer. They are melanoma, basal cell carcinoma, and squamous cell carcinoma. Doing monthly skin checks is the best way to find new marks or skin changes. Follow the instructions below for checking your skin.   The ABCDEs of checking moles for melanoma   Check your moles or growths for signs of melanoma using ABCDE:   Asymmetry: the sides of the mole or growth don t match  Border: the edges are ragged, notched, or blurred  Color: the color within the mole or growth varies  Diameter: the mole or growth is larger than 6 mm (size of a pencil eraser)  Evolving: the size, shape, or color of the mole or growth is changing (evolving is not shown in the images below)    Checking for other types of skin cancer  Basal cell carcinoma or squamous cell carcinoma have symptoms such as:     A spot or mole that looks different from all other marks on your skin  Changes in how an area feels, such as itching, tenderness, or pain  Changes in the skin's surface, such as oozing, bleeding, or scaliness  A sore that does not heal  New swelling or redness beyond  the border of a mole    Who s at risk?  Anyone can get skin cancer. But you are at greater risk if you have:   Fair skin, light-colored hair, or light-colored eyes  Many moles or abnormal moles on your skin  A history of sunburns from sunlight or tanning beds  A family history of skin cancer  A history of exposure to radiation or chemicals  A weakened immune system  If you have had skin cancer in the past, you are at risk for recurring skin cancer.   How to check your skin  Do your monthly skin checkups in front of a full-length mirror. Check all parts of your body, including your:   Head (ears, face, neck, and scalp)  Torso (front, back, and sides)  Arms (tops, undersides, upper, and lower armpits)  Hands (palms, backs, and fingers, including under the nails)  Buttocks and genitals  Legs (front, back, and sides)  Feet (tops, soles, toes, including under the nails, and between toes)  If you have a lot of moles, take digital photos of them each month. Make sure to take photos both up close and from a distance. These can help you see if any moles change over time.   Most skin changes are not cancer. But if you see any changes in your skin, call your doctor right away. Only he or she can diagnose a problem. If you have skin cancer, seeing your doctor can be the first step toward getting the treatment that could save your life.   eEvent last reviewed this educational content on 4/1/2019 2000-2020 [employer]. [address] [place] [address], [place] [address]. All rights reserved. This information is not intended as a substitute for professional medical care. Always follow your healthcare professional's instructions.       When should I call my doctor?  If you are worsening or not improving, please, contact us or seek urgent care as noted below.     Who should I call with questions (adults)?  [facility] (adult and pediatric): [phone]  [facility]  [facility] (adult): [phone]  [facility] ([place], [place], [place] and [place]) [phone]  For urgent needs outside of business hours call the [facility] at [phone] and ask for the dermatology resident on call to be paged  If this is a medical emergency and you are unable to reach an ER, Call 911      If you need a prescription refill, please contact your pharmacy. Refills are approved or denied by our Physicians during normal business hours, Monday through Fridays  Per office policy, refills will not be granted if you have not been seen within the past year (or sooner depending on your child's condition)

## 2023-10-26 NOTE — NURSING NOTE
Aixa Coker's chief complaint for this visit includes:  Chief Complaint   Patient presents with    Skin Check     Patient is here for a skin check. Patient presents no concerns. Patient has hx of bcc.      PCP: Gracia Luu    Referring Provider:  No referring provider defined for this encounter.    There were no vitals taken for this visit.  No Pain (0)        Allergies   Allergen Reactions    Nka [No Known Allergies]          Do you need any medication refills at today's visit? No    Yin Jones MA

## 2023-11-30 ENCOUNTER — HOSPITAL ENCOUNTER (OUTPATIENT)
Dept: MAMMOGRAPHY | Facility: CLINIC | Age: 53
Discharge: HOME | End: 2023-11-30
Attending: PHYSICIAN ASSISTANT | Admitting: PHYSICIAN ASSISTANT
Payer: COMMERCIAL

## 2023-11-30 DIAGNOSIS — Z12.31 VISIT FOR SCREENING MAMMOGRAM: ICD-10-CM

## 2023-11-30 PROCEDURE — 77067 SCR MAMMO BI INCL CAD: CPT

## 2024-01-15 ENCOUNTER — PATIENT OUTREACH (OUTPATIENT)
Dept: CARE COORDINATION | Facility: CLINIC | Age: 54
End: 2024-01-15
Payer: COMMERCIAL

## 2024-01-29 ENCOUNTER — PATIENT OUTREACH (OUTPATIENT)
Dept: CARE COORDINATION | Facility: CLINIC | Age: 54
End: 2024-01-29
Payer: COMMERCIAL

## 2024-04-07 ENCOUNTER — HEALTH MAINTENANCE LETTER (OUTPATIENT)
Age: 54
End: 2024-04-07

## 2024-05-08 ENCOUNTER — OFFICE VISIT (OUTPATIENT)
Dept: FAMILY MEDICINE | Facility: CLINIC | Age: 54
End: 2024-05-08
Payer: COMMERCIAL

## 2024-05-08 VITALS
WEIGHT: 175 LBS | HEART RATE: 79 BPM | OXYGEN SATURATION: 98 % | DIASTOLIC BLOOD PRESSURE: 74 MMHG | BODY MASS INDEX: 32.2 KG/M2 | HEIGHT: 62 IN | SYSTOLIC BLOOD PRESSURE: 120 MMHG | TEMPERATURE: 98.8 F

## 2024-05-08 DIAGNOSIS — S20.363A: Primary | ICD-10-CM
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
## 2024-05-08 PROCEDURE — 99212 OFFICE O/P EST SF 10 MIN: CPT | Performed by: FAMILY MEDICINE

## 2024-05-08 NOTE — PROGRESS NOTES
"  Assessment & Plan     Tick bite of both sides of front wall of thorax, initial encounter  Wood tick '  Please see clinical references for patient education.              BMI  Estimated body mass index is 32.27 kg/m  as calculated from the following:    Height as of this encounter: 1.568 m (5' 1.75\").    Weight as of this encounter: 79.4 kg (175 lb).         FUTURE APPOINTMENTS:       - Follow-up for annual visit or as needed    Mario Rivers is a 54 year old, presenting for the following health issues:  Insect Bites        5/8/2024     2:36 PM   Additional Questions   Roomed by Violetta LACEY CMA     History of Present Illness       Reason for visit:  Tic bite  Symptom onset:  1-3 days ago    She eats 4 or more servings of fruits and vegetables daily.She consumes 0 sweetened beverage(s) daily.She exercises with enough effort to increase her heart rate 9 or less minutes per day.  She exercises with enough effort to increase her heart rate 3 or less days per week.   She is taking medications regularly.       The tick was first noticed on the upper back 1 days.   Associated symptoms:  Fever: no noted fevers   Other symptoms:   Headache no  Sore throat no  Fatigue no  Nausea/vomiting no  Muscle aches no  Joint pain no  Swollen lymph glands no  Stiff neck no      She has had the tick no idea how long it was on. She has no symptoms           Review of Systems  Constitutional, HEENT, cardiovascular, pulmonary, gi and gu systems are negative, except as otherwise noted.      Objective    /74 (BP Location: Right arm, Patient Position: Sitting, Cuff Size: Adult Large)   Pulse 79   Temp 98.8  F (37.1  C) (Tympanic)   Ht 1.568 m (5' 1.75\")   Wt 79.4 kg (175 lb)   SpO2 98%   BMI 32.27 kg/m    Body mass index is 32.27 kg/m .  Physical Exam   GENERAL: alert and no distress  SKIN: mid back with slight erythema surrounding healing open area non tender   She brought the tick in and it is a wood tick             Signed " Electronically by: Lias Bonilla MD

## 2024-11-17 NOTE — LETTER
[facility]  [address] [place] [address]  [phone]    November 3, 2022    Aixa Coker  [address]  [place] [address]      Dear Aixa    You are scheduled for Mohs Surgery on: Tuesday, December 20, 2022- check in at 7:30 am for a 7:45 am appointment     Please check in at 2nd floor [facility].     You don't need to arrive more than 5-10 minutes prior to your appointment time.     Be sure to eat a good breakfast and bathe and wash your hair prior to Surgery. Please bring  with you if this is above your neck    If you are taking any anti-coagulants that are prescribed by your Doctor (such as Coumadin/warfarin, Plavix, Aspirin, Ibuprofen), please continue taking them.     However, If you are taking anti-coagulants over the counter without  a Doctor's order for a Medical condition, please discontinue them 10 days prior to Surgery.      Please wear loose comfortable clothing as it could possibly be 4-6 hours until your surgery is completed depending upon how many layers of tissue need to be removed.     Thank you,    Ashok Lee MD                    
17-Nov-2024 07:53

## 2025-01-25 ENCOUNTER — HOSPITAL ENCOUNTER (EMERGENCY)
Facility: CLINIC | Age: 55
Discharge: HOME | End: 2025-01-25
Payer: COMMERCIAL

## 2025-01-25 VITALS
OXYGEN SATURATION: 99 % | RESPIRATION RATE: 16 BRPM | SYSTOLIC BLOOD PRESSURE: 148 MMHG | HEART RATE: 90 BPM | DIASTOLIC BLOOD PRESSURE: 75 MMHG | TEMPERATURE: 97.2 F

## 2025-01-25 DIAGNOSIS — H81.10 BENIGN PAROXYSMAL POSITIONAL VERTIGO, UNSPECIFIED LATERALITY: ICD-10-CM

## 2025-01-25 PROCEDURE — G0463 HOSPITAL OUTPT CLINIC VISIT: HCPCS

## 2025-01-25 PROCEDURE — 99213 OFFICE O/P EST LOW 20 MIN: CPT

## 2025-01-25 RX ORDER — MECLIZINE HCL 12.5 MG 12.5 MG/1
25 TABLET ORAL 3 TIMES DAILY PRN
Qty: 18 TABLET | Refills: 0 | Status: SHIPPED | OUTPATIENT
Start: 2025-01-25 | End: 2025-01-28

## 2025-01-25 RX ORDER — ONDANSETRON 4 MG/1
4 TABLET, ORALLY DISINTEGRATING ORAL EVERY 8 HOURS PRN
Qty: 20 TABLET | Refills: 0 | Status: SHIPPED | OUTPATIENT
Start: 2025-01-25

## 2025-01-25 RX ORDER — MECLIZINE HCL 12.5 MG 12.5 MG/1
12.5 TABLET ORAL 3 TIMES DAILY PRN
Qty: 9 TABLET | Refills: 0 | Status: SHIPPED | OUTPATIENT
Start: 2025-01-25 | End: 2025-01-25

## 2025-01-25 ASSESSMENT — ACTIVITIES OF DAILY LIVING (ADL): ADLS_ACUITY_SCORE: 41

## 2025-01-25 ASSESSMENT — COLUMBIA-SUICIDE SEVERITY RATING SCALE - C-SSRS
1. IN THE PAST MONTH, HAVE YOU WISHED YOU WERE DEAD OR WISHED YOU COULD GO TO SLEEP AND NOT WAKE UP?: NO
2. HAVE YOU ACTUALLY HAD ANY THOUGHTS OF KILLING YOURSELF IN THE PAST MONTH?: NO
6. HAVE YOU EVER DONE ANYTHING, STARTED TO DO ANYTHING, OR PREPARED TO DO ANYTHING TO END YOUR LIFE?: NO

## 2025-01-25 NOTE — DISCHARGE INSTRUCTIONS
Christina Suazo MD Vertigo treatment - YouTube    If no improvement over the next few days please follow-up with physical therapy.

## 2025-01-29 NOTE — ED PROVIDER NOTES
History   No chief complaint on file.    HPI  Aixa Artemio Coker is a 54 year old female who presents to  with CC of dizziness. Pt reports she woke up with vertigo symptoms. She notes symptoms seem positional and are worse when she moves her head and eyes a certain way. Denies previous hx of vertigo. She states it feels like motion sickness. Denies vomiting, recent viral illness, headache, fever, rashes, chest pain, sob.     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 [email or web address] Consult mild evidence of synovitis in the 2nd,3rd,4th MCP joints. Placed on prednisone labs drawn for RA.Pt marquez fu in 3 weeks  1/31/07 Fu on muscles aches. Labs WNL,ordered DEXA. If normal will con't NSAIDs,stopped relafen for now. Celebrex samples given  4/10/08 [facility] FU pt doing well on current meds FU in 4mos.      Hemorrhoids 03/06/2007     Priority: Medium     Problem list name updated by automated process. Provider to review      Decreased libido 03/06/2007     Priority: Medium        Past Medical History:    Past Medical History:   Diagnosis Date    Basal cell carcinoma     Cancer (H)     Dyspareunia     Frequent UTI     heavy menses        Past Surgical History:    Past Surgical History:   Procedure Laterality Date    C/SECTION, LOW TRANSVERSE  11/99, 10/00    COLONOSCOPY N/A 07/26/2019    Procedure: COLONOSCOPY;  Surgeon: Madhu Sosa  DO Evan;  Location: [facility]    DILATE CERVIX, HYSTEROSCOPY, ABLATE ENDOMETRIUM RADHA, COMBINED  01/01/2010    TONSILLECTOMY & ADENOIDECTOMY  01/01/1976    [facility] ORAL SURGERY PROCEDURE  01/01/1988    [place] [facility]       Family History:    Family History   Problem Relation Age of Onset    Hypertension Mother     Cancer Mother         basal cell    Osteoporosis Mother     Basal cell carcinoma Mother     Cancer Father         squamous cell, skin    Prostate Cancer Father 68    Diabetes Father     C.A.D. Father     Basal cell carcinoma Father     Basal cell carcinoma Sister     Diabetes Sister         gestational diabetes    Respiratory Sister         asthma    Diabetes Paternal Grandmother        Social History:  Marital Status:   [2]  Social History     Tobacco Use    Smoking status: Never    Smokeless tobacco: Never   Substance Use Topics    Alcohol use: Yes     Comment: Avg. 1-2 drinks per week    Drug use: No        Medications:    augmented betamethasone dipropionate (DIPROLENE AF) 0.05 % external cream  Calcium Carbonate-Vitamin D (CALCIUM 600/VITAMIN D PO)  MAGNESIUM PO  ondansetron (ZOFRAN ODT) 4 MG ODT tab          Review of Systems   All other systems reviewed and are negative.      Physical Exam   BP: (!) 148/75  Pulse: 90  Temp: 97.2  F (36.2  C)  Resp: 16  SpO2: 99 %      Physical Exam  Vitals and nursing note reviewed.   Constitutional:       General: She is not in acute distress.     Appearance: Normal appearance. She is not ill-appearing or toxic-appearing.   HENT:      Head: Normocephalic.      Right Ear: Tympanic membrane, ear canal and external ear normal. There is no impacted cerumen.      Left Ear: Tympanic membrane, ear canal and external ear normal. There is no impacted cerumen.      Nose: Nose normal. No congestion or rhinorrhea.      Mouth/Throat:      Mouth: Mucous membranes are moist.   Eyes:      Extraocular Movements: Extraocular movements intact.      Right eye: Nystagmus present.       Left eye: Nystagmus present.      Pupils: Pupils are equal, round, and reactive to light.   Cardiovascular:      Rate and Rhythm: Normal rate and regular rhythm.      Pulses: Normal pulses.      Heart sounds: Normal heart sounds.   Pulmonary:      Effort: Pulmonary effort is normal.      Breath sounds: Normal breath sounds.   Neurological:      Mental Status: She is alert and oriented to person, place, and time.      Motor: No weakness.      Coordination: Coordination normal.      Gait: Gait normal.   Psychiatric:         Mood and Affect: Mood normal.         Behavior: Behavior normal.         ED Course        Procedures           No results found for this or any previous visit (from the past 24 hours).    Medications - No data to display    Assessments & Plan (with Medical Decision Making)     I have reviewed the nursing notes.    I have reviewed the findings, diagnosis, plan and need for follow up with the patient.    Medical Decision Making  54 year old female who presents to  with CC of dizziness. Pt reports she woke up with vertigo symptoms. She notes symptoms seem positional and are worse when she moves her head and eyes a certain way. Denies previous hx of vertigo. She states it feels like motion sickness. Denies vomiting, recent viral illness, headache, fever, rashes, chest pain, sob.     Exam above. Pt in no acute distress. Vitals WNL. No weakness, Nystagmus present.     Attempted Epley maneuver in room today. This did elicit vomiting followed by minimal relief. I suspect BPPV and prescribed pt zofran, meclizine and gave her directions regarding Epley maneuver which may be performed at home. If no improvement follow-up with PT.    Prior to making a final disposition on this patient the results of patient's tests and other diagnostic studies were discussed with the patient. All questions were answered. Patient expressed understanding of the plan and was amenable to it.     Disclaimer: This note consists  of symbols derived from keyboarding, dictation and/or voice recognition software. As a result, there may be errors in the script that have gone undetected. Please consider this when interpreting information found in this chart.      Discharge Medication List as of 1/25/2025  3:45 PM        START taking these medications    Details   ondansetron (ZOFRAN ODT) 4 MG ODT tab Take 1 tablet (4 mg) by mouth every 8 hours as needed for nausea., Disp-20 tablet, R-0, E-Prescribe             Final diagnoses:   Benign paroxysmal positional vertigo, unspecified laterality       1/25/2025   [facility] EMERGENCY DEPT       Maria Teresa Lema PA-C  01/29/25 [id number]

## 2025-04-19 ENCOUNTER — HEALTH MAINTENANCE LETTER (OUTPATIENT)
Age: 55
End: 2025-04-19

## 2025-08-18 ENCOUNTER — OFFICE VISIT (OUTPATIENT)
Dept: DERMATOLOGY | Facility: CLINIC | Age: 55
End: 2025-08-18
Payer: COMMERCIAL

## 2025-08-18 DIAGNOSIS — L21.9 DERMATITIS, SEBORRHEIC: ICD-10-CM

## 2025-08-18 DIAGNOSIS — D22.9 MULTIPLE BENIGN NEVI: Primary | ICD-10-CM

## 2025-08-18 DIAGNOSIS — Z85.828 HISTORY OF NONMELANOMA SKIN CANCER: ICD-10-CM

## 2025-08-18 DIAGNOSIS — D18.01 CHERRY ANGIOMA: ICD-10-CM

## 2025-08-18 DIAGNOSIS — Z12.83 SKIN CANCER SCREENING: ICD-10-CM

## 2025-08-18 DIAGNOSIS — L82.1 SEBORRHEIC KERATOSES: ICD-10-CM

## 2025-08-18 DIAGNOSIS — L82.0 INFLAMED SEBORRHEIC KERATOSIS: ICD-10-CM

## 2025-08-18 PROCEDURE — 99213 OFFICE O/P EST LOW 20 MIN: CPT | Mod: 25 | Performed by: PHYSICIAN ASSISTANT

## 2025-08-18 PROCEDURE — 17110 DESTRUCTION B9 LES UP TO 14: CPT | Performed by: PHYSICIAN ASSISTANT

## 2025-08-18 RX ORDER — HYDROCORTISONE 25 MG/G
CREAM TOPICAL 2 TIMES DAILY
Qty: 30 G | Refills: 3 | Status: SHIPPED | OUTPATIENT
Start: 2025-08-18

## (undated) RX ORDER — GLYCOPYRROLATE 0.2 MG/ML
INJECTION, SOLUTION INTRAMUSCULAR; INTRAVENOUS
Status: DISPENSED
Start: 2019-07-26